# Patient Record
Sex: MALE | Race: BLACK OR AFRICAN AMERICAN | NOT HISPANIC OR LATINO | URBAN - METROPOLITAN AREA
[De-identification: names, ages, dates, MRNs, and addresses within clinical notes are randomized per-mention and may not be internally consistent; named-entity substitution may affect disease eponyms.]

---

## 2017-11-11 ENCOUNTER — OUTPATIENT (OUTPATIENT)
Dept: OUTPATIENT SERVICES | Facility: HOSPITAL | Age: 67
LOS: 1 days | End: 2017-11-11
Payer: MEDICARE

## 2017-11-11 ENCOUNTER — APPOINTMENT (OUTPATIENT)
Dept: MRI IMAGING | Facility: CLINIC | Age: 67
End: 2017-11-11
Payer: MEDICARE

## 2017-11-11 DIAGNOSIS — Z98.89 OTHER SPECIFIED POSTPROCEDURAL STATES: Chronic | ICD-10-CM

## 2017-11-11 DIAGNOSIS — M19.011 PRIMARY OSTEOARTHRITIS, RIGHT SHOULDER: ICD-10-CM

## 2017-11-11 PROCEDURE — 73221 MRI JOINT UPR EXTREM W/O DYE: CPT | Mod: 26,RT

## 2017-11-11 PROCEDURE — 73221 MRI JOINT UPR EXTREM W/O DYE: CPT

## 2018-01-20 ENCOUNTER — APPOINTMENT (OUTPATIENT)
Dept: MRI IMAGING | Facility: CLINIC | Age: 68
End: 2018-01-20
Payer: MEDICARE

## 2018-01-20 ENCOUNTER — OUTPATIENT (OUTPATIENT)
Dept: OUTPATIENT SERVICES | Facility: HOSPITAL | Age: 68
LOS: 1 days | End: 2018-01-20
Payer: MEDICARE

## 2018-01-20 DIAGNOSIS — Z98.89 OTHER SPECIFIED POSTPROCEDURAL STATES: Chronic | ICD-10-CM

## 2018-01-20 DIAGNOSIS — Z00.8 ENCOUNTER FOR OTHER GENERAL EXAMINATION: ICD-10-CM

## 2018-01-20 PROCEDURE — 73721 MRI JNT OF LWR EXTRE W/O DYE: CPT

## 2018-01-20 PROCEDURE — 73721 MRI JNT OF LWR EXTRE W/O DYE: CPT | Mod: 26,LT

## 2018-01-24 PROBLEM — M25.572 LEFT ANKLE PAIN, UNSPECIFIED CHRONICITY: Status: ACTIVE | Noted: 2018-01-24

## 2018-01-26 ENCOUNTER — APPOINTMENT (OUTPATIENT)
Dept: ORTHOPEDIC SURGERY | Facility: CLINIC | Age: 68
End: 2018-01-26
Payer: MEDICARE

## 2018-01-26 VITALS
DIASTOLIC BLOOD PRESSURE: 83 MMHG | HEART RATE: 89 BPM | BODY MASS INDEX: 30.51 KG/M2 | HEIGHT: 69 IN | SYSTOLIC BLOOD PRESSURE: 135 MMHG | WEIGHT: 206 LBS

## 2018-01-26 DIAGNOSIS — M25.572 PAIN IN LEFT ANKLE AND JOINTS OF LEFT FOOT: ICD-10-CM

## 2018-01-26 PROCEDURE — 73610 X-RAY EXAM OF ANKLE: CPT | Mod: LT

## 2018-01-26 PROCEDURE — 99203 OFFICE O/P NEW LOW 30 MIN: CPT

## 2018-01-30 ENCOUNTER — APPOINTMENT (OUTPATIENT)
Dept: ORTHOPEDIC SURGERY | Facility: CLINIC | Age: 68
End: 2018-01-30
Payer: MEDICARE

## 2018-01-30 VITALS — WEIGHT: 206 LBS | BODY MASS INDEX: 30.51 KG/M2 | HEIGHT: 69 IN

## 2018-01-30 DIAGNOSIS — Z78.9 OTHER SPECIFIED HEALTH STATUS: ICD-10-CM

## 2018-01-30 DIAGNOSIS — M19.011 PRIMARY OSTEOARTHRITIS, RIGHT SHOULDER: ICD-10-CM

## 2018-01-30 DIAGNOSIS — M75.21 BICIPITAL TENDINITIS, RIGHT SHOULDER: ICD-10-CM

## 2018-01-30 DIAGNOSIS — M75.111 INCOMPLETE ROTATOR CUFF TEAR OR RUPTURE OF RIGHT SHOULDER, NOT SPECIFIED AS TRAUMATIC: ICD-10-CM

## 2018-01-30 PROCEDURE — 99215 OFFICE O/P EST HI 40 MIN: CPT

## 2018-01-30 PROCEDURE — 73030 X-RAY EXAM OF SHOULDER: CPT | Mod: RT

## 2018-01-30 RX ORDER — TADALAFIL 10 MG/1
10 TABLET, FILM COATED ORAL
Qty: 6 | Refills: 0 | Status: ACTIVE | COMMUNITY
Start: 2017-09-20

## 2018-01-30 RX ORDER — SITAGLIPTIN 100 MG/1
100 TABLET, FILM COATED ORAL
Qty: 90 | Refills: 0 | Status: ACTIVE | COMMUNITY
Start: 2017-07-12

## 2018-01-30 RX ORDER — METFORMIN HYDROCHLORIDE 1000 MG/1
1000 TABLET, COATED ORAL
Qty: 180 | Refills: 0 | Status: ACTIVE | COMMUNITY
Start: 2017-07-12

## 2018-01-30 RX ORDER — ROSUVASTATIN CALCIUM 10 MG/1
10 TABLET, FILM COATED ORAL
Qty: 90 | Refills: 0 | Status: ACTIVE | COMMUNITY
Start: 2017-10-04

## 2018-01-30 RX ORDER — ROSUVASTATIN CALCIUM 5 MG/1
TABLET, FILM COATED ORAL
Refills: 0 | Status: ACTIVE | COMMUNITY

## 2018-01-30 RX ORDER — DICLOFENAC SODIUM 50 MG/1
50 TABLET, DELAYED RELEASE ORAL
Qty: 60 | Refills: 0 | Status: ACTIVE | COMMUNITY
Start: 2017-11-03

## 2018-01-30 RX ORDER — MONTELUKAST 10 MG/1
10 TABLET, FILM COATED ORAL
Qty: 90 | Refills: 0 | Status: ACTIVE | COMMUNITY
Start: 2017-04-07

## 2018-02-13 ENCOUNTER — OUTPATIENT (OUTPATIENT)
Dept: OUTPATIENT SERVICES | Facility: HOSPITAL | Age: 68
LOS: 1 days | End: 2018-02-13
Payer: MEDICARE

## 2018-02-13 VITALS
DIASTOLIC BLOOD PRESSURE: 74 MMHG | RESPIRATION RATE: 16 BRPM | WEIGHT: 209.88 LBS | OXYGEN SATURATION: 98 % | SYSTOLIC BLOOD PRESSURE: 119 MMHG | HEIGHT: 68 IN | HEART RATE: 74 BPM | TEMPERATURE: 98 F

## 2018-02-13 DIAGNOSIS — Z98.89 OTHER SPECIFIED POSTPROCEDURAL STATES: Chronic | ICD-10-CM

## 2018-02-13 DIAGNOSIS — S86.012S STRAIN OF LEFT ACHILLES TENDON, SEQUELA: ICD-10-CM

## 2018-02-13 DIAGNOSIS — Z01.818 ENCOUNTER FOR OTHER PREPROCEDURAL EXAMINATION: ICD-10-CM

## 2018-02-13 DIAGNOSIS — S86.012A STRAIN OF LEFT ACHILLES TENDON, INITIAL ENCOUNTER: ICD-10-CM

## 2018-02-13 DIAGNOSIS — Z90.89 ACQUIRED ABSENCE OF OTHER ORGANS: Chronic | ICD-10-CM

## 2018-02-13 DIAGNOSIS — Z98.890 OTHER SPECIFIED POSTPROCEDURAL STATES: Chronic | ICD-10-CM

## 2018-02-13 LAB
ANION GAP SERPL CALC-SCNC: 7 MMOL/L — SIGNIFICANT CHANGE UP (ref 5–17)
BUN SERPL-MCNC: 16 MG/DL — SIGNIFICANT CHANGE UP (ref 7–23)
CALCIUM SERPL-MCNC: 9.2 MG/DL — SIGNIFICANT CHANGE UP (ref 8.4–10.5)
CHLORIDE SERPL-SCNC: 106 MMOL/L — SIGNIFICANT CHANGE UP (ref 96–108)
CO2 SERPL-SCNC: 28 MMOL/L — SIGNIFICANT CHANGE UP (ref 22–31)
CREAT SERPL-MCNC: 1.17 MG/DL — SIGNIFICANT CHANGE UP (ref 0.5–1.3)
GLUCOSE SERPL-MCNC: 106 MG/DL — HIGH (ref 70–99)
HBA1C BLD-MCNC: 6 % — HIGH (ref 4–5.6)
HCT VFR BLD CALC: 47.6 % — SIGNIFICANT CHANGE UP (ref 39–50)
HGB BLD-MCNC: 15.2 G/DL — SIGNIFICANT CHANGE UP (ref 13–17)
MCHC RBC-ENTMCNC: 27.5 PG — SIGNIFICANT CHANGE UP (ref 27–34)
MCHC RBC-ENTMCNC: 32 GM/DL — SIGNIFICANT CHANGE UP (ref 32–36)
MCV RBC AUTO: 85.7 FL — SIGNIFICANT CHANGE UP (ref 80–100)
PLATELET # BLD AUTO: 202 K/UL — SIGNIFICANT CHANGE UP (ref 150–400)
POTASSIUM SERPL-MCNC: 4 MMOL/L — SIGNIFICANT CHANGE UP (ref 3.5–5.3)
POTASSIUM SERPL-SCNC: 4 MMOL/L — SIGNIFICANT CHANGE UP (ref 3.5–5.3)
RBC # BLD: 5.55 M/UL — SIGNIFICANT CHANGE UP (ref 4.2–5.8)
RBC # FLD: 14.9 % — HIGH (ref 10.3–14.5)
SODIUM SERPL-SCNC: 141 MMOL/L — SIGNIFICANT CHANGE UP (ref 135–145)
WBC # BLD: 6.9 K/UL — SIGNIFICANT CHANGE UP (ref 3.8–10.5)
WBC # FLD AUTO: 6.9 K/UL — SIGNIFICANT CHANGE UP (ref 3.8–10.5)

## 2018-02-13 PROCEDURE — 93005 ELECTROCARDIOGRAM TRACING: CPT

## 2018-02-13 PROCEDURE — 93010 ELECTROCARDIOGRAM REPORT: CPT | Mod: NC

## 2018-02-13 PROCEDURE — 85027 COMPLETE CBC AUTOMATED: CPT

## 2018-02-13 PROCEDURE — G0463: CPT

## 2018-02-13 PROCEDURE — 83036 HEMOGLOBIN GLYCOSYLATED A1C: CPT

## 2018-02-13 PROCEDURE — 80048 BASIC METABOLIC PNL TOTAL CA: CPT

## 2018-02-13 RX ORDER — DICLOFENAC SODIUM 75 MG/1
1 TABLET, DELAYED RELEASE ORAL
Qty: 0 | Refills: 0 | COMMUNITY

## 2018-02-13 NOTE — H&P PST ADULT - PROBLEM SELECTOR PLAN 1
Achilles Tendon Repair, V-Y Lengthening Tendon Transfer Graft Jacket Augmentation LEFT Foot on 3/1/18.

## 2018-02-13 NOTE — H&P PST ADULT - PSH
S/P colonoscopy  2013  S/P hernia repair  30 years ago  S/P laminectomy  and fusion - 2014 - L4-5  S/P tonsillectomy

## 2018-02-13 NOTE — H&P PST ADULT - HISTORY OF PRESENT ILLNESS
66yo male patient with 5 month history of difficulty standing, walking and pain in his left ankle. He states that in September he was playing basketball and fell. He had swelling in the ankle, and 5 days later he sought treatment. He was told x-rays are negative and he was treated with NSAIDs and ice. In November he returned w/ no improvement and and MRI was done. He was told that the MRI revealed and tendon rupture. He was referred to Dr. Jones in January and surgery was recommended. He was given a Cortisone injection at that time.  He has pain in his left ankle which he rates at 4-5/10. He is taking Diclofenac and occasionally Aleve with partial relief.  He is also wearing a support stocking on his left leg. He presents today for PSTs.

## 2018-02-13 NOTE — H&P PST ADULT - FAMILY HISTORY
Mother  Still living? No  Family history of stomach cancer, Age at diagnosis: Age Unknown     Sibling  Still living? Yes, Estimated age: 51-60  Family history of lung cancer, Age at diagnosis: Age Unknown  Family history of drug addiction, Age at diagnosis: Age Unknown  Family history of coronary artery disease in sister, Age at diagnosis: Age Unknown  Family history of hypertension in brother, Age at diagnosis: Age Unknown

## 2018-02-13 NOTE — H&P PST ADULT - PMH
Hyperlipemia    Obesity, Class I, BMI 30-34.9    Osteoarthritis    Rupture of left Achilles tendon, sequela    Seasonal allergies    Tear of right rotator cuff, unspecified tear extent    Type 2 diabetes mellitus    Vitamin D deficiency

## 2018-02-13 NOTE — H&P PST ADULT - MUSCULOSKELETAL
details… detailed exam no calf tenderness/no joint swelling/no joint erythema/no joint warmth/ROM intact/decreased ROM due to pain/diminished strength/left ankle

## 2018-02-13 NOTE — H&P PST ADULT - ASSESSMENT
66yo male patient scheduled for surgery on 3/1/18. He will obtain medical clearance from his PMD and he will obtain instructions re: management of his DM medications pre-op from him.  He will be NPO as per Anesthesia and will take Pepcid @ hs on 2/28 and on AM of surgery with a sip of water. He will hold Diclofenac and Aleve for one week pre-op. All other pre-op instructions reviewed with patient.

## 2018-02-26 RX ORDER — CHLORHEXIDINE GLUCONATE 213 G/1000ML
1 SOLUTION TOPICAL ONCE
Qty: 0 | Refills: 0 | Status: COMPLETED | OUTPATIENT
Start: 2018-03-01 | End: 2018-03-01

## 2018-02-26 NOTE — ASU DISCHARGE PLAN (ADULT/PEDIATRIC). - MEDICATION SUMMARY - MEDICATIONS TO TAKE
I will START or STAY ON the medications listed below when I get home from the hospital:    oxyCODONE 5 mg oral tablet  -- 1 tab(s) by mouth every 4 hours, As Needed for pain MDD:6  -- Caution federal law prohibits the transfer of this drug to any person other  than the person for whom it was prescribed.  It is very important that you take or use this exactly as directed.  Do not skip doses or discontinue unless directed by your doctor.  May cause drowsiness.  Alcohol may intensify this effect.  Use care when operating dangerous machinery.  This prescription cannot be refilled.  Using more of this medication than prescribed may cause serious breathing problems.    -- Indication: For Surgical pain    Tylenol 500 mg oral tablet  -- 2 tab(s) by mouth every 6 hours, As Needed for mild ankle pain  -- Indication: For Mild pain    Januvia 100 mg oral tablet  -- 1 tab(s) by mouth once a day  -- Indication: For Diabetes    metformin 500 mg oral tablet  -- 1 tab(s) by mouth 2 times a day  -- Indication: For Diabetes    Crestor 10 mg oral tablet  -- 1 tab(s) by mouth once a day (at bedtime)  -- Indication: For Cholesterol    Pepcid AC Maximum Strength 20 mg oral tablet  -- 1 tab(s) by mouth 2 times a day(x2 preoperatively )  -- Indication: For Stop    MiraLax oral powder for reconstitution  -- 1 packet(s) by mouth once a day (at bedtime), As Needed only if constipated  -- Indication: For Constipation    Colace 100 mg oral capsule  -- 1 cap(s) by mouth 2 times a day to prevent constipation. Stop for loose BMs  -- Indication: For Constipation    Singulair 10 mg oral tablet  -- 1 tab(s) by mouth once a day  -- Indication: For Pulmonary    Vitamin D3 2000 intl units oral tablet  -- 1 tab(s) by mouth once a day  -- Indication: For Vitamin

## 2018-02-26 NOTE — ASU DISCHARGE PLAN (ADULT/PEDIATRIC). - SPECIAL INSTRUCTIONS
apply ice to operative site 20 minutes on and 20 minutes off for next 48 hours  Pain meds as per MD  Take an over the counter stool softener like Colace to avoid constipation while taking a narcotic for pain May walk with crutches or walker and NO WEIGHT on [ Left ] foot as tolerated.   Elevate [ Left ] foot on blankets above heart level (toes above the nose) for 1 hour 3 times a day.  Apply ice anna to ankle for 30 minutes every 3 hours daily.  Keep Cast clean and dry daily.  Cover [ Left ] foot in shower daily with plastic bag & tape ( or cast bag ) to keep dry.  Take an over the counter stool softener like Colace to avoid constipation while taking a narcotic for pain  See the Surgeon in the office in 10 days.  Call the Surgeon for fever, fall or injury to operative leg, or severe ankle/calf pain. May walk with crutches or walker and NO WEIGHT on [ Left ] foot as tolerated.   Elevate [ Left ] foot on blankets above heart level (toes above the nose) for 1 hour 3 times a day.  Apply ice anna to ankle for 30 minutes every 3 hours daily.  Keep Cast clean and dry daily.  Cover [ Left ] foot in shower daily with plastic bag & tape ( or cast bag ) to keep dry.  Take an over the counter stool softener like Colace to avoid constipation while taking a narcotic for pain  Diabetes care & fingerstick glucose per routine. Call your Medical Doctor for glucoses < 100 or > 200  See the Surgeon in the office in 10 days.  Call the Surgeon for fever, fall or injury to operative leg, or severe ankle/calf pain.

## 2018-02-26 NOTE — ASU DISCHARGE PLAN (ADULT/PEDIATRIC). - INSTRUCTIONS
For Constipation :   • Increase your water intake. Drink at least 8 glasses of water daily.  • Try adding fiber to your diet by eating fruits, vegetables and foods that are rich in grains.  • If you do experience constipation, you may take an over-the-counter stool softener/laxative such as Lori Colace, [place] or  Milk of Magnesia.

## 2018-02-26 NOTE — ASU DISCHARGE PLAN (ADULT/PEDIATRIC). - FOLLOWUP APPOINTMENT CLINIC/PHYSICIAN
Call Dr. Jones's office [phone] to make an appointment to be seen within 7-10 days Call Dr. Jones's office [phone] to make an appointment to be seen within 7-10 days

## 2018-02-26 NOTE — ASU DISCHARGE PLAN (ADULT/PEDIATRIC). - MEDICATION SUMMARY - MEDICATIONS TO STOP TAKING
I will STOP taking the medications listed below when I get home from the hospital:    Aleve 220 mg oral tablet  -- 1 tab(s) by mouth once a day, As Needed    Pepcid AC Maximum Strength 20 mg oral tablet  -- 1 tab(s) by mouth 2 times a day(x2 preoperatively )

## 2018-02-28 RX ORDER — FAMOTIDINE 10 MG/ML
1 INJECTION INTRAVENOUS
Qty: 0 | Refills: 0 | COMMUNITY
Start: 2018-02-28 | End: 2018-03-01

## 2018-03-01 ENCOUNTER — INPATIENT (INPATIENT)
Facility: HOSPITAL | Age: 68
LOS: 0 days | Discharge: HOME | DRG: 502 | End: 2018-03-02
Attending: ORTHOPAEDIC SURGERY | Admitting: ORTHOPAEDIC SURGERY
Payer: COMMERCIAL

## 2018-03-01 ENCOUNTER — TRANSCRIPTION ENCOUNTER (OUTPATIENT)
Age: 68
End: 2018-03-01

## 2018-03-01 ENCOUNTER — APPOINTMENT (OUTPATIENT)
Dept: ORTHOPEDIC SURGERY | Facility: HOSPITAL | Age: 68
End: 2018-03-01

## 2018-03-01 VITALS
RESPIRATION RATE: 18 BRPM | OXYGEN SATURATION: 100 % | DIASTOLIC BLOOD PRESSURE: 72 MMHG | SYSTOLIC BLOOD PRESSURE: 101 MMHG | HEIGHT: 69 IN | HEART RATE: 67 BPM | TEMPERATURE: 98 F | WEIGHT: 197.31 LBS

## 2018-03-01 DIAGNOSIS — Z98.89 OTHER SPECIFIED POSTPROCEDURAL STATES: Chronic | ICD-10-CM

## 2018-03-01 DIAGNOSIS — Z98.890 OTHER SPECIFIED POSTPROCEDURAL STATES: Chronic | ICD-10-CM

## 2018-03-01 DIAGNOSIS — S86.012A STRAIN OF LEFT ACHILLES TENDON, INITIAL ENCOUNTER: ICD-10-CM

## 2018-03-01 DIAGNOSIS — Z01.818 ENCOUNTER FOR OTHER PREPROCEDURAL EXAMINATION: ICD-10-CM

## 2018-03-01 DIAGNOSIS — Z90.89 ACQUIRED ABSENCE OF OTHER ORGANS: Chronic | ICD-10-CM

## 2018-03-01 PROCEDURE — 99222 1ST HOSP IP/OBS MODERATE 55: CPT

## 2018-03-01 RX ORDER — DEXTROSE 50 % IN WATER 50 %
25 SYRINGE (ML) INTRAVENOUS ONCE
Qty: 0 | Refills: 0 | Status: DISCONTINUED | OUTPATIENT
Start: 2018-03-01 | End: 2018-03-02

## 2018-03-01 RX ORDER — OXYCODONE HYDROCHLORIDE 5 MG/1
5 TABLET ORAL
Qty: 0 | Refills: 0 | Status: DISCONTINUED | OUTPATIENT
Start: 2018-03-01 | End: 2018-03-02

## 2018-03-01 RX ORDER — SODIUM CHLORIDE 9 MG/ML
1000 INJECTION, SOLUTION INTRAVENOUS
Qty: 0 | Refills: 0 | Status: DISCONTINUED | OUTPATIENT
Start: 2018-03-01 | End: 2018-03-01

## 2018-03-01 RX ORDER — INSULIN LISPRO 100/ML
VIAL (ML) SUBCUTANEOUS
Qty: 0 | Refills: 0 | Status: DISCONTINUED | OUTPATIENT
Start: 2018-03-01 | End: 2018-03-02

## 2018-03-01 RX ORDER — HYDROMORPHONE HYDROCHLORIDE 2 MG/ML
0.5 INJECTION INTRAMUSCULAR; INTRAVENOUS; SUBCUTANEOUS
Qty: 0 | Refills: 0 | Status: DISCONTINUED | OUTPATIENT
Start: 2018-03-01 | End: 2018-03-01

## 2018-03-01 RX ORDER — CEFAZOLIN SODIUM 1 G
2000 VIAL (EA) INJECTION EVERY 8 HOURS
Qty: 0 | Refills: 0 | Status: COMPLETED | OUTPATIENT
Start: 2018-03-01 | End: 2018-03-02

## 2018-03-01 RX ORDER — GLUCAGON INJECTION, SOLUTION 0.5 MG/.1ML
1 INJECTION, SOLUTION SUBCUTANEOUS ONCE
Qty: 0 | Refills: 0 | Status: DISCONTINUED | OUTPATIENT
Start: 2018-03-01 | End: 2018-03-02

## 2018-03-01 RX ORDER — CEFAZOLIN SODIUM 1 G
2000 VIAL (EA) INJECTION ONCE
Qty: 0 | Refills: 0 | Status: COMPLETED | OUTPATIENT
Start: 2018-03-01 | End: 2018-03-01

## 2018-03-01 RX ORDER — SODIUM CHLORIDE 9 MG/ML
1000 INJECTION, SOLUTION INTRAVENOUS
Qty: 0 | Refills: 0 | Status: DISCONTINUED | OUTPATIENT
Start: 2018-03-01 | End: 2018-03-02

## 2018-03-01 RX ORDER — OXYCODONE HYDROCHLORIDE 5 MG/1
5 TABLET ORAL ONCE
Qty: 0 | Refills: 0 | Status: DISCONTINUED | OUTPATIENT
Start: 2018-03-01 | End: 2018-03-02

## 2018-03-01 RX ORDER — ATORVASTATIN CALCIUM 80 MG/1
40 TABLET, FILM COATED ORAL AT BEDTIME
Qty: 0 | Refills: 0 | Status: DISCONTINUED | OUTPATIENT
Start: 2018-03-01 | End: 2018-03-02

## 2018-03-01 RX ORDER — ONDANSETRON 8 MG/1
4 TABLET, FILM COATED ORAL ONCE
Qty: 0 | Refills: 0 | Status: DISCONTINUED | OUTPATIENT
Start: 2018-03-01 | End: 2018-03-02

## 2018-03-01 RX ORDER — OXYCODONE HYDROCHLORIDE 5 MG/1
10 TABLET ORAL
Qty: 0 | Refills: 0 | Status: DISCONTINUED | OUTPATIENT
Start: 2018-03-01 | End: 2018-03-02

## 2018-03-01 RX ORDER — HYDROMORPHONE HYDROCHLORIDE 2 MG/ML
0.5 INJECTION INTRAMUSCULAR; INTRAVENOUS; SUBCUTANEOUS
Qty: 0 | Refills: 0 | Status: DISCONTINUED | OUTPATIENT
Start: 2018-03-01 | End: 2018-03-02

## 2018-03-01 RX ORDER — DOCUSATE SODIUM 100 MG
100 CAPSULE ORAL THREE TIMES A DAY
Qty: 0 | Refills: 0 | Status: DISCONTINUED | OUTPATIENT
Start: 2018-03-01 | End: 2018-03-02

## 2018-03-01 RX ORDER — POLYETHYLENE GLYCOL 3350 17 G/17G
17 POWDER, FOR SOLUTION ORAL DAILY
Qty: 0 | Refills: 0 | Status: DISCONTINUED | OUTPATIENT
Start: 2018-03-01 | End: 2018-03-02

## 2018-03-01 RX ORDER — DICLOFENAC SODIUM 75 MG/1
1 TABLET, DELAYED RELEASE ORAL
Qty: 0 | Refills: 0 | COMMUNITY

## 2018-03-01 RX ORDER — METFORMIN HYDROCHLORIDE 850 MG/1
500 TABLET ORAL
Qty: 0 | Refills: 0 | Status: DISCONTINUED | OUTPATIENT
Start: 2018-03-01 | End: 2018-03-02

## 2018-03-01 RX ORDER — ONDANSETRON 8 MG/1
4 TABLET, FILM COATED ORAL EVERY 6 HOURS
Qty: 0 | Refills: 0 | Status: DISCONTINUED | OUTPATIENT
Start: 2018-03-01 | End: 2018-03-02

## 2018-03-01 RX ORDER — ONDANSETRON 8 MG/1
4 TABLET, FILM COATED ORAL ONCE
Qty: 0 | Refills: 0 | Status: DISCONTINUED | OUTPATIENT
Start: 2018-03-01 | End: 2018-03-01

## 2018-03-01 RX ORDER — ACETAMINOPHEN 500 MG
1000 TABLET ORAL ONCE
Qty: 0 | Refills: 0 | Status: COMPLETED | OUTPATIENT
Start: 2018-03-01 | End: 2018-03-01

## 2018-03-01 RX ORDER — OXYCODONE HYDROCHLORIDE 5 MG/1
1 TABLET ORAL
Qty: 30 | Refills: 0 | OUTPATIENT
Start: 2018-03-01

## 2018-03-01 RX ORDER — DEXTROSE 50 % IN WATER 50 %
12.5 SYRINGE (ML) INTRAVENOUS ONCE
Qty: 0 | Refills: 0 | Status: DISCONTINUED | OUTPATIENT
Start: 2018-03-01 | End: 2018-03-02

## 2018-03-01 RX ORDER — ACETAMINOPHEN 500 MG
1000 TABLET ORAL EVERY 6 HOURS
Qty: 0 | Refills: 0 | Status: COMPLETED | OUTPATIENT
Start: 2018-03-02 | End: 2018-03-02

## 2018-03-01 RX ORDER — ACETAMINOPHEN 500 MG
1000 TABLET ORAL EVERY 6 HOURS
Qty: 0 | Refills: 0 | Status: DISCONTINUED | OUTPATIENT
Start: 2018-03-01 | End: 2018-03-01

## 2018-03-01 RX ORDER — MONTELUKAST 4 MG/1
10 TABLET, CHEWABLE ORAL DAILY
Qty: 0 | Refills: 0 | Status: DISCONTINUED | OUTPATIENT
Start: 2018-03-01 | End: 2018-03-02

## 2018-03-01 RX ORDER — ENOXAPARIN SODIUM 100 MG/ML
40 INJECTION SUBCUTANEOUS EVERY 24 HOURS
Qty: 0 | Refills: 0 | Status: DISCONTINUED | OUTPATIENT
Start: 2018-03-02 | End: 2018-03-02

## 2018-03-01 RX ORDER — DEXTROSE 50 % IN WATER 50 %
1 SYRINGE (ML) INTRAVENOUS ONCE
Qty: 0 | Refills: 0 | Status: DISCONTINUED | OUTPATIENT
Start: 2018-03-01 | End: 2018-03-02

## 2018-03-01 RX ORDER — OXYCODONE AND ACETAMINOPHEN 5; 325 MG/1; MG/1
1 TABLET ORAL EVERY 4 HOURS
Qty: 0 | Refills: 0 | Status: DISCONTINUED | OUTPATIENT
Start: 2018-03-01 | End: 2018-03-01

## 2018-03-01 RX ORDER — ACETAMINOPHEN 500 MG
1000 TABLET ORAL EVERY 8 HOURS
Qty: 0 | Refills: 0 | Status: DISCONTINUED | OUTPATIENT
Start: 2018-03-02 | End: 2018-03-02

## 2018-03-01 RX ADMIN — Medication 1000 MILLIGRAM(S): at 19:15

## 2018-03-01 RX ADMIN — Medication 100 MILLIGRAM(S): at 22:46

## 2018-03-01 RX ADMIN — Medication 1000 MILLIGRAM(S): at 23:57

## 2018-03-01 RX ADMIN — Medication 400 MILLIGRAM(S): at 18:47

## 2018-03-01 RX ADMIN — HYDROMORPHONE HYDROCHLORIDE 0.5 MILLIGRAM(S): 2 INJECTION INTRAMUSCULAR; INTRAVENOUS; SUBCUTANEOUS at 19:15

## 2018-03-01 RX ADMIN — CHLORHEXIDINE GLUCONATE 1 APPLICATION(S): 213 SOLUTION TOPICAL at 11:22

## 2018-03-01 RX ADMIN — HYDROMORPHONE HYDROCHLORIDE 0.5 MILLIGRAM(S): 2 INJECTION INTRAMUSCULAR; INTRAVENOUS; SUBCUTANEOUS at 18:46

## 2018-03-01 RX ADMIN — Medication 400 MILLIGRAM(S): at 23:57

## 2018-03-01 RX ADMIN — SODIUM CHLORIDE 100 MILLILITER(S): 9 INJECTION, SOLUTION INTRAVENOUS at 18:38

## 2018-03-01 NOTE — CONSULT NOTE ADULT - PROBLEM SELECTOR RECOMMENDATION 9
S/p repair, asymptomatic post-op, pain control, IVF hydration, I & O monitoring, incentive spirometry. PT & OT consults, mobilization & weight bearing as per orthopedic surgery team.

## 2018-03-01 NOTE — CONSULT NOTE ADULT - PROBLEM SELECTOR RECOMMENDATION 2
controlled, Glycohemoglobin done on February 13th, 2018 was 6.0, FS pre-op was 96 mg/dl, will hold Januvia, continue only on Metformin 500 mg PO BID in addition to insulin Lispro on a sliding scale before meals.

## 2018-03-01 NOTE — CONSULT NOTE ADULT - SUBJECTIVE AND OBJECTIVE BOX
This is a 66 y/o M with PMH of Type II DM, Dyslipidemia, This is a 66 y/o M with PMH of Type II DM, Dyslipidemia, OA, Seasonal Allergies, and Obesity presented for left achilles tendon repair. Patient had a fall on September while playing basketball, had moderate pain associated with swelling, that was worsening with weight baring, and improve with Diclofenac, had a x ray then that was negative for fracture, was treated with NSAIDs, with little improvement, had MRI that revealed achilles tendon rupture so was scheduled for repair surgery. Routine post-op medical evaluation was requested. Patient denies any chest pain, SOB, palpitations, dizziness, headache, paraesthesias, or focal muscle weakness.              ALLERGIES :  NKDA.              PAST MEDICAL & SURGICAL HISTORY:    Vitamin D deficiency  Tear of right rotator cuff, unspecified tear extent  Osteoarthritis  Seasonal allergies  Type 2 diabetes mellitus  Obesity, Class I, BMI 30-34.9  Rupture of left Achilles tendon, sequela  Hyperlipemia  S/P tonsillectomy  S/P laminectomy: and fusion - 2014 - L4-5  S/P colonoscopy: 2013  S/P hernia repair: 30 years ago                SOCIAL HISTORY :    , lives with family, non smoker, no ETOH or drug abuse.                FAMILY HISTORY :     Family history of hypertension in brother (Sibling)  Family history of coronary artery disease in sister (Sibling)  Family history of drug addiction (Sibling)  Family history of lung cancer (Sibling)  Family history of stomach cancer              MEDICATIONS  (STANDING):    acetaminophen   Tablet 1000 milliGRAM(s) Oral every 8 hours  acetaminophen  IVPB. 1000 milliGRAM(s) IV Intermittent every 6 hours  atorvastatin 40 milliGRAM(s) Oral at bedtime  ceFAZolin   IVPB 2000 milliGRAM(s) IV Intermittent every 8 hours  dextrose 5%. 1000 milliLiter(s) (50 mL/Hr) IV Continuous <Continuous>  dextrose 50% Injectable 12.5 Gram(s) IV Push once  dextrose 50% Injectable 25 Gram(s) IV Push once  dextrose 50% Injectable 25 Gram(s) IV Push once  docusate sodium 100 milliGRAM(s) Oral three times a day  enoxaparin Injectable 40 milliGRAM(s) SubCutaneous every 24 hours  insulin lispro (HumaLOG) corrective regimen sliding scale   SubCutaneous three times a day before meals  lactated ringers. 1000 milliLiter(s) (80 mL/Hr) IV Continuous <Continuous>  lactated ringers. 1000 milliLiter(s) (100 mL/Hr) IV Continuous <Continuous>  metFORMIN 500 milliGRAM(s) Oral two times a day with meals  montelukast 10 milliGRAM(s) Oral daily              MEDICATIONS  (PRN):    dextrose Gel 1 Dose(s) Oral once PRN Blood Glucose LESS THAN 70 milliGRAM(s)/deciliter  glucagon  Injectable 1 milliGRAM(s) IntraMuscular once PRN Glucose LESS THAN 70 milligrams/deciliter  HYDROmorphone  Injectable 0.5 milliGRAM(s) IV Push every 3 hours PRN Severe Pain (7 - 10)  HYDROmorphone  Injectable 0.5 milliGRAM(s) IV Push every 10 minutes PRN Moderate Pain  ondansetron Injectable 4 milliGRAM(s) IV Push once PRN Nausea and/or Vomiting  ondansetron Injectable 4 milliGRAM(s) IV Push every 6 hours PRN Nausea and/or Vomiting  oxyCODONE    IR 5 milliGRAM(s) Oral once PRN Moderate Pain (4 - 6)  oxyCODONE    IR 10 milliGRAM(s) Oral every 3 hours PRN Moderate Pain  oxyCODONE    IR 5 milliGRAM(s) Oral every 3 hours PRN Mild Pain  polyethylene glycol 3350 17 Gram(s) Oral daily PRN Constipation              Home Medications:    Colace 100 mg oral capsule: 1 cap(s) orally 2 times a day to prevent constipation. Stop for loose BMs (01 Mar 2018 18:48)  Crestor 10 mg oral tablet: 1 tab(s) orally once a day (at bedtime) (01 Mar 2018 11:42)  Januvia 100 mg oral tablet: 1 tab(s) orally once a day (01 Mar 2018 11:42)  metformin 500 mg oral tablet: 1 tab(s) orally 2 times a day (01 Mar 2018 11:42)  MiraLax oral powder for reconstitution: 1 packet(s) orally once a day (at bedtime), As Needed only if constipated (01 Mar 2018 18:48)  Singulair 10 mg oral tablet: 1 tab(s) orally once a day (01 Mar 2018 11:42)  Tylenol 500 mg oral tablet: 2 tab(s) orally every 6 hours, As Needed for mild ankle pain (01 Mar 2018 18:48)  Vitamin D3 2000 intl units oral tablet: 1 tab(s) orally once a day (01 Mar 2018 11:42)        REVIEW OF SYSTEMS:    CONSTITUTIONAL: No fever, weight loss, or fatigue.  EYES: No eye pain, visual disturbances, or discharge.  ENMT:  No difficulty hearing, tinnitus, vertigo; No sinus or throat pain.  NECK: No pain or stiffness.  RESPIRATORY: No cough, wheezing, or hemoptysis; No shortness of breath.  CARDIOVASCULAR: No chest pain, palpitations, dizziness, or leg swelling.  GASTROINTESTINAL: No abdominal pain, no nausea, vomiting, or hematemesis; No diarrhea or Change in bowel habits. No melena or hematochezia.  GENITOURINARY: No dysuria, frequency, hematuria, or incontinence.  NEUROLOGICAL: No headaches, focal muscle weakness, numbness, or tremors.  SKIN: No itching, burning or rashes.  MUSCULOSKELETAL: No joint swelling or pain other than surgical site, well controlled.  PSYCHIATRIC: No depression, anxiety, or agitation.  HEME/LYMPH: No easy bruising, bleeding gums, or nose bleed.  ALLERGY AND IMMUNOLOGIC: No hives or eczema.                  Vital signs:  Vital Signs Last 24 Hrs  T(C): 36.4 (01 Mar 2018 23:13), Max: 36.7 (01 Mar 2018 11:23)  T(F): 97.5 (01 Mar 2018 23:13), Max: 98.1 (01 Mar 2018 11:23)  HR: 74 (01 Mar 2018 23:13) (67 - 92)  BP: 104/65 (01 Mar 2018 23:13) (101/72 - 126/82)  BP(mean): --  RR: 14 (01 Mar 2018 23:13) (9 - 19)  SpO2: 98% (01 Mar 2018 23:13) (98% - 100%)        PHYSICAL EXAM:    GENERAL: NAD, well-groomed, well-developed.  HEAD:  Atraumatic, Normocephalic.  EYES: PERRLA, conjunctiva clear.  ENMT: no nasal discharge, no tye-pharyngeal erythema or exudates, MMM.   NECK: Supple, No JVD.  NERVOUS SYSTEM:  Alert & oriented X3, neurologically intact grossly.  CHEST/LUNG: Good air entry B/L, no rales, rhonchi, or wheezing.  HEART: Normal S1 & S2, no murmurs, or extra sounds.  ABDOMEN: Soft, non tender, non distended; bowel sounds present, no palpable masses or organomegaly.  EXTREMITIES:  No clubbing, cyanosis, or edema.  VASCULAR: 2+ radial, DPA / PTA pulses B/L.  SKIN: No rashes or lesions.  PSYCH: normal affect & behavior.              LABs:    Basic Metabolic Panel (02.13.18 @ 09:28)    Sodium, Serum: 141 mmol/L    Potassium, Serum: 4.0 mmol/L    Chloride, Serum: 106 mmol/L    Carbon Dioxide, Serum: 28 mmol/L    Anion Gap, Serum: 7 mmol/L    Blood Urea Nitrogen, Serum: 16 mg/dL    Creatinine, Serum: 1.17 mg/dL    Glucose, Serum: 106 mg/dL    Calcium, Total Serum: 9.2 mg/dL    eGFR if Non : 65: Interpretative comment    eGFR if : 75 mL/min/1.73M2    Hemoglobin A1C, Whole Blood (02.13.18 @ 14:50)    Hemoglobin A1C, Whole Blood: 6.0: Method: Immunoassay       Reference Range                4.0-5.6%       High risk (prediabetic)        5.7-6.4%       Diabetic, diagnostic             >=6.5%       ADA diabetic treatment goal       <7.0%  The Hemoglobin A1c testing is NGSP-certified.Reference ranges are based  upon the 2010 recommendations of  the American Diabetes Association.  Interpretation may vary for children  and adolescents. %    Complete Blood Count (02.13.18 @ 09:28)    WBC Count: 6.9 K/uL    RBC Count: 5.55 M/uL    Hemoglobin: 15.2 g/dL    Hematocrit: 47.6 %    Mean Cell Volume: 85.7 fl    Mean Cell Hemoglobin: 27.5 pg    Mean Cell Hemoglobin Conc: 32.0 gm/dL    Red Cell Distrib Width: 14.9 %    Platelet Count - Automated: 202 K/uL              EKG     As per my review shows SR at 70/min, normal UT & QTc intervals, normal QRS voltage, duration, and axis (+60), with normal transition, no ST-T abnormality.    - This is a 68 y/o M with PMH of Type II DM, Dyslipidemia, OA, Seasonal Allergies, and Obesity presented for left achilles tendon repair. Patient had a fall on September while playing basketball, had moderate pain associated with swelling, that was worsening with weight baring, and improve with Diclofenac, had a x ray then that was negative for fracture, was treated with NSAIDs, with little improvement, had MRI that revealed achilles tendon rupture so was scheduled for repair surgery. Routine post-op medical evaluation was requested. Patient denies any chest pain, SOB, palpitations, dizziness, headache, paraesthesias, or focal muscle weakness.              ALLERGIES :  NKDA.              PAST MEDICAL & SURGICAL HISTORY:    Vitamin D deficiency  Tear of right rotator cuff, unspecified tear extent  Osteoarthritis  Seasonal allergies  Type 2 diabetes mellitus  Obesity, Class I, BMI 30-34.9  Rupture of left Achilles tendon, sequela  Hyperlipemia  S/P tonsillectomy  S/P laminectomy: and fusion - 2014 - L4-5  S/P colonoscopy: 2013  S/P hernia repair: 30 years ago                SOCIAL HISTORY :    , lives with family, non smoker, no ETOH or drug abuse.                FAMILY HISTORY :     Family history of hypertension in brother (Sibling)  Family history of coronary artery disease in sister (Sibling)  Family history of drug addiction (Sibling)  Family history of lung cancer (Sibling)  Family history of stomach cancer              MEDICATIONS  (STANDING):    acetaminophen   Tablet 1000 milliGRAM(s) Oral every 8 hours  acetaminophen  IVPB. 1000 milliGRAM(s) IV Intermittent every 6 hours  atorvastatin 40 milliGRAM(s) Oral at bedtime  ceFAZolin   IVPB 2000 milliGRAM(s) IV Intermittent every 8 hours  dextrose 5%. 1000 milliLiter(s) (50 mL/Hr) IV Continuous <Continuous>  dextrose 50% Injectable 12.5 Gram(s) IV Push once  dextrose 50% Injectable 25 Gram(s) IV Push once  dextrose 50% Injectable 25 Gram(s) IV Push once  docusate sodium 100 milliGRAM(s) Oral three times a day  enoxaparin Injectable 40 milliGRAM(s) SubCutaneous every 24 hours  insulin lispro (HumaLOG) corrective regimen sliding scale   SubCutaneous three times a day before meals  lactated ringers. 1000 milliLiter(s) (80 mL/Hr) IV Continuous <Continuous>  lactated ringers. 1000 milliLiter(s) (100 mL/Hr) IV Continuous <Continuous>  metFORMIN 500 milliGRAM(s) Oral two times a day with meals  montelukast 10 milliGRAM(s) Oral daily              MEDICATIONS  (PRN):    dextrose Gel 1 Dose(s) Oral once PRN Blood Glucose LESS THAN 70 milliGRAM(s)/deciliter  glucagon  Injectable 1 milliGRAM(s) IntraMuscular once PRN Glucose LESS THAN 70 milligrams/deciliter  HYDROmorphone  Injectable 0.5 milliGRAM(s) IV Push every 3 hours PRN Severe Pain (7 - 10)  HYDROmorphone  Injectable 0.5 milliGRAM(s) IV Push every 10 minutes PRN Moderate Pain  ondansetron Injectable 4 milliGRAM(s) IV Push once PRN Nausea and/or Vomiting  ondansetron Injectable 4 milliGRAM(s) IV Push every 6 hours PRN Nausea and/or Vomiting  oxyCODONE    IR 5 milliGRAM(s) Oral once PRN Moderate Pain (4 - 6)  oxyCODONE    IR 10 milliGRAM(s) Oral every 3 hours PRN Moderate Pain  oxyCODONE    IR 5 milliGRAM(s) Oral every 3 hours PRN Mild Pain  polyethylene glycol 3350 17 Gram(s) Oral daily PRN Constipation              Home Medications:    Colace 100 mg oral capsule: 1 cap(s) orally 2 times a day to prevent constipation. Stop for loose BMs (01 Mar 2018 18:48)  Crestor 10 mg oral tablet: 1 tab(s) orally once a day (at bedtime) (01 Mar 2018 11:42)  Januvia 100 mg oral tablet: 1 tab(s) orally once a day (01 Mar 2018 11:42)  metformin 500 mg oral tablet: 1 tab(s) orally 2 times a day (01 Mar 2018 11:42)  MiraLax oral powder for reconstitution: 1 packet(s) orally once a day (at bedtime), As Needed only if constipated (01 Mar 2018 18:48)  Singulair 10 mg oral tablet: 1 tab(s) orally once a day (01 Mar 2018 11:42)  Tylenol 500 mg oral tablet: 2 tab(s) orally every 6 hours, As Needed for mild ankle pain (01 Mar 2018 18:48)  Vitamin D3 2000 intl units oral tablet: 1 tab(s) orally once a day (01 Mar 2018 11:42)        REVIEW OF SYSTEMS:    CONSTITUTIONAL: No fever, weight loss, or fatigue.  EYES: No eye pain, visual disturbances, or discharge.  ENMT:  No difficulty hearing, tinnitus, vertigo; No sinus or throat pain.  NECK: No pain or stiffness.  RESPIRATORY: No cough, wheezing, or hemoptysis; No shortness of breath.  CARDIOVASCULAR: No chest pain, palpitations, dizziness, or leg swelling.  GASTROINTESTINAL: No abdominal pain, no nausea, vomiting, or hematemesis; No diarrhea or Change in bowel habits. No melena or hematochezia.  GENITOURINARY: No dysuria, frequency, hematuria, or incontinence.  NEUROLOGICAL: No headaches, focal muscle weakness, numbness, or tremors.  SKIN: No itching, burning or rashes.  MUSCULOSKELETAL: No joint swelling or pain other than surgical site, well controlled.  PSYCHIATRIC: No depression, anxiety, or agitation.  HEME/LYMPH: No easy bruising, bleeding gums, or nose bleed.  ALLERGY AND IMMUNOLOGIC: No hives or eczema.                  Vital signs:  Vital Signs Last 24 Hrs  T(C): 36.4 (01 Mar 2018 23:13), Max: 36.7 (01 Mar 2018 11:23)  T(F): 97.5 (01 Mar 2018 23:13), Max: 98.1 (01 Mar 2018 11:23)  HR: 74 (01 Mar 2018 23:13) (67 - 92)  BP: 104/65 (01 Mar 2018 23:13) (101/72 - 126/82)  BP(mean): --  RR: 14 (01 Mar 2018 23:13) (9 - 19)  SpO2: 98% (01 Mar 2018 23:13) (98% - 100%)        PHYSICAL EXAM:    GENERAL: NAD, well-groomed, well-developed, obese.  HEAD:  Atraumatic, Normocephalic.  EYES: PERRLA, conjunctiva clear.  ENMT: no nasal discharge, no tye-pharyngeal erythema or exudates, MMM.   NECK: Supple, No JVD.  NERVOUS SYSTEM:  Alert & oriented X3, neurologically intact grossly.  CHEST/LUNG: Good air entry B/L, no rales, rhonchi, or wheezing.  HEART: Normal S1 & S2, no murmurs, or extra sounds.  ABDOMEN: Soft, non tender, non distended; bowel sounds present, no palpable masses or organomegaly.  EXTREMITIES:  No clubbing, cyanosis, or edema.  VASCULAR: 2+ radial pulses, 2+ DPA / PTA pulses on the right, good capillary refill on left toe nails (left side post-op cast).  SKIN: No rashes or lesions.  PSYCH: normal affect & behavior.              LABs:    Basic Metabolic Panel (02.13.18 @ 09:28)    Sodium, Serum: 141 mmol/L    Potassium, Serum: 4.0 mmol/L    Chloride, Serum: 106 mmol/L    Carbon Dioxide, Serum: 28 mmol/L    Anion Gap, Serum: 7 mmol/L    Blood Urea Nitrogen, Serum: 16 mg/dL    Creatinine, Serum: 1.17 mg/dL    Glucose, Serum: 106 mg/dL    Calcium, Total Serum: 9.2 mg/dL    eGFR if Non : 65: Interpretative comment    eGFR if : 75 mL/min/1.73M2    Hemoglobin A1C, Whole Blood (02.13.18 @ 14:50)    Hemoglobin A1C, Whole Blood: 6.0: Method: Immunoassay       Reference Range                4.0-5.6%       High risk (prediabetic)        5.7-6.4%       Diabetic, diagnostic             >=6.5%       ADA diabetic treatment goal       <7.0%  The Hemoglobin A1c testing is NGSP-certified.Reference ranges are based  upon the 2010 recommendations of  the American Diabetes Association.  Interpretation may vary for children  and adolescents. %    Complete Blood Count (02.13.18 @ 09:28)    WBC Count: 6.9 K/uL    RBC Count: 5.55 M/uL    Hemoglobin: 15.2 g/dL    Hematocrit: 47.6 %    Mean Cell Volume: 85.7 fl    Mean Cell Hemoglobin: 27.5 pg    Mean Cell Hemoglobin Conc: 32.0 gm/dL    Red Cell Distrib Width: 14.9 %    Platelet Count - Automated: 202 K/uL              EKG     As per my review shows SR at 70/min, normal MO & QTc intervals, normal QRS voltage, duration, and axis (+60), with normal transition, no ST-T abnormality.      -

## 2018-03-01 NOTE — CONSULT NOTE ADULT - ASSESSMENT
Asymptomatic post-op 66 y/o M with PMH of Type II DM, Dyslipidemia, OA, Seasonal Allergies, and Obesity.

## 2018-03-01 NOTE — CONSULT NOTE ADULT - PROBLEM SELECTOR RECOMMENDATION 6
Patient is at high risk for VTE, was started on right sided sequential compression device for DVT prophylaxis in addition to LMWH 40 mg subcutaneous daily.

## 2018-03-01 NOTE — BRIEF OPERATIVE NOTE - PROCEDURE
<<-----Click on this checkbox to enter Procedure Achilles tendon repair  03/01/2018    Active  [email or web address]

## 2018-03-02 ENCOUNTER — TRANSCRIPTION ENCOUNTER (OUTPATIENT)
Age: 68
End: 2018-03-02

## 2018-03-02 VITALS
HEART RATE: 70 BPM | SYSTOLIC BLOOD PRESSURE: 112 MMHG | OXYGEN SATURATION: 97 % | DIASTOLIC BLOOD PRESSURE: 72 MMHG | RESPIRATION RATE: 18 BRPM | TEMPERATURE: 98 F

## 2018-03-02 DIAGNOSIS — E78.5 HYPERLIPIDEMIA, UNSPECIFIED: ICD-10-CM

## 2018-03-02 DIAGNOSIS — E66.9 OBESITY, UNSPECIFIED: ICD-10-CM

## 2018-03-02 DIAGNOSIS — J30.2 OTHER SEASONAL ALLERGIC RHINITIS: ICD-10-CM

## 2018-03-02 DIAGNOSIS — Z29.9 ENCOUNTER FOR PROPHYLACTIC MEASURES, UNSPECIFIED: ICD-10-CM

## 2018-03-02 DIAGNOSIS — S86.012S STRAIN OF LEFT ACHILLES TENDON, SEQUELA: ICD-10-CM

## 2018-03-02 DIAGNOSIS — E11.9 TYPE 2 DIABETES MELLITUS WITHOUT COMPLICATIONS: ICD-10-CM

## 2018-03-02 LAB
ANION GAP SERPL CALC-SCNC: 9 MMOL/L — SIGNIFICANT CHANGE UP (ref 5–17)
BASOPHILS # BLD AUTO: 0 K/UL — SIGNIFICANT CHANGE UP (ref 0–0.2)
BASOPHILS NFR BLD AUTO: 0.4 % — SIGNIFICANT CHANGE UP (ref 0–2)
BUN SERPL-MCNC: 12 MG/DL — SIGNIFICANT CHANGE UP (ref 7–23)
CALCIUM SERPL-MCNC: 9.4 MG/DL — SIGNIFICANT CHANGE UP (ref 8.4–10.5)
CHLORIDE SERPL-SCNC: 106 MMOL/L — SIGNIFICANT CHANGE UP (ref 96–108)
CO2 SERPL-SCNC: 27 MMOL/L — SIGNIFICANT CHANGE UP (ref 22–31)
CREAT SERPL-MCNC: 1.03 MG/DL — SIGNIFICANT CHANGE UP (ref 0.5–1.3)
EOSINOPHIL # BLD AUTO: 0 K/UL — SIGNIFICANT CHANGE UP (ref 0–0.5)
EOSINOPHIL NFR BLD AUTO: 0 % — SIGNIFICANT CHANGE UP (ref 0–6)
GLUCOSE SERPL-MCNC: 120 MG/DL — HIGH (ref 70–99)
HCT VFR BLD CALC: 43.5 % — SIGNIFICANT CHANGE UP (ref 39–50)
HGB BLD-MCNC: 13.8 G/DL — SIGNIFICANT CHANGE UP (ref 13–17)
LYMPHOCYTES # BLD AUTO: 1.4 K/UL — SIGNIFICANT CHANGE UP (ref 1–3.3)
LYMPHOCYTES # BLD AUTO: 13.7 % — SIGNIFICANT CHANGE UP (ref 13–44)
MCHC RBC-ENTMCNC: 27.8 PG — SIGNIFICANT CHANGE UP (ref 27–34)
MCHC RBC-ENTMCNC: 31.8 GM/DL — LOW (ref 32–36)
MCV RBC AUTO: 87.4 FL — SIGNIFICANT CHANGE UP (ref 80–100)
MONOCYTES # BLD AUTO: 0.7 K/UL — SIGNIFICANT CHANGE UP (ref 0–0.9)
MONOCYTES NFR BLD AUTO: 7 % — SIGNIFICANT CHANGE UP (ref 2–14)
NEUTROPHILS # BLD AUTO: 7.9 K/UL — HIGH (ref 1.8–7.4)
NEUTROPHILS NFR BLD AUTO: 78.9 % — HIGH (ref 43–77)
PLATELET # BLD AUTO: 238 K/UL — SIGNIFICANT CHANGE UP (ref 150–400)
POTASSIUM SERPL-MCNC: 4.7 MMOL/L — SIGNIFICANT CHANGE UP (ref 3.5–5.3)
POTASSIUM SERPL-SCNC: 4.7 MMOL/L — SIGNIFICANT CHANGE UP (ref 3.5–5.3)
RBC # BLD: 4.97 M/UL — SIGNIFICANT CHANGE UP (ref 4.2–5.8)
RBC # FLD: 13.9 % — SIGNIFICANT CHANGE UP (ref 10.3–14.5)
SODIUM SERPL-SCNC: 142 MMOL/L — SIGNIFICANT CHANGE UP (ref 135–145)
WBC # BLD: 10 K/UL — SIGNIFICANT CHANGE UP (ref 3.8–10.5)
WBC # FLD AUTO: 10 K/UL — SIGNIFICANT CHANGE UP (ref 3.8–10.5)

## 2018-03-02 PROCEDURE — G8979: CPT

## 2018-03-02 PROCEDURE — 97165 OT EVAL LOW COMPLEX 30 MIN: CPT

## 2018-03-02 PROCEDURE — 99239 HOSP IP/OBS DSCHRG MGMT >30: CPT

## 2018-03-02 PROCEDURE — 97116 GAIT TRAINING THERAPY: CPT

## 2018-03-02 PROCEDURE — G8988: CPT

## 2018-03-02 PROCEDURE — 97530 THERAPEUTIC ACTIVITIES: CPT

## 2018-03-02 PROCEDURE — 85027 COMPLETE CBC AUTOMATED: CPT

## 2018-03-02 PROCEDURE — 97110 THERAPEUTIC EXERCISES: CPT

## 2018-03-02 PROCEDURE — C1713: CPT

## 2018-03-02 PROCEDURE — 80048 BASIC METABOLIC PNL TOTAL CA: CPT

## 2018-03-02 PROCEDURE — 97161 PT EVAL LOW COMPLEX 20 MIN: CPT

## 2018-03-02 PROCEDURE — 97535 SELF CARE MNGMENT TRAINING: CPT

## 2018-03-02 PROCEDURE — 94664 DEMO&/EVAL PT USE INHALER: CPT

## 2018-03-02 PROCEDURE — 82962 GLUCOSE BLOOD TEST: CPT

## 2018-03-02 PROCEDURE — 99261: CPT

## 2018-03-02 PROCEDURE — G8980: CPT

## 2018-03-02 PROCEDURE — G8987: CPT

## 2018-03-02 PROCEDURE — 97164 PT RE-EVAL EST PLAN CARE: CPT

## 2018-03-02 PROCEDURE — 27652 REPAIR/GRAFT ACHILLES TENDON: CPT

## 2018-03-02 PROCEDURE — G8989: CPT

## 2018-03-02 PROCEDURE — G8978: CPT

## 2018-03-02 RX ADMIN — OXYCODONE HYDROCHLORIDE 10 MILLIGRAM(S): 5 TABLET ORAL at 06:00

## 2018-03-02 RX ADMIN — METFORMIN HYDROCHLORIDE 500 MILLIGRAM(S): 850 TABLET ORAL at 08:49

## 2018-03-02 RX ADMIN — Medication 400 MILLIGRAM(S): at 05:30

## 2018-03-02 RX ADMIN — Medication 100 MILLIGRAM(S): at 06:15

## 2018-03-02 RX ADMIN — MONTELUKAST 10 MILLIGRAM(S): 4 TABLET, CHEWABLE ORAL at 11:40

## 2018-03-02 RX ADMIN — OXYCODONE HYDROCHLORIDE 5 MILLIGRAM(S): 5 TABLET ORAL at 13:18

## 2018-03-02 RX ADMIN — OXYCODONE HYDROCHLORIDE 10 MILLIGRAM(S): 5 TABLET ORAL at 05:33

## 2018-03-02 RX ADMIN — Medication 100 MILLIGRAM(S): at 05:31

## 2018-03-02 RX ADMIN — Medication 1000 MILLIGRAM(S): at 12:20

## 2018-03-02 RX ADMIN — OXYCODONE HYDROCHLORIDE 5 MILLIGRAM(S): 5 TABLET ORAL at 14:15

## 2018-03-02 RX ADMIN — Medication 100 MILLIGRAM(S): at 13:18

## 2018-03-02 RX ADMIN — Medication 1000 MILLIGRAM(S): at 05:37

## 2018-03-02 RX ADMIN — ENOXAPARIN SODIUM 40 MILLIGRAM(S): 100 INJECTION SUBCUTANEOUS at 08:48

## 2018-03-02 RX ADMIN — Medication 400 MILLIGRAM(S): at 11:40

## 2018-03-02 RX ADMIN — OXYCODONE HYDROCHLORIDE 10 MILLIGRAM(S): 5 TABLET ORAL at 08:50

## 2018-03-02 RX ADMIN — OXYCODONE HYDROCHLORIDE 10 MILLIGRAM(S): 5 TABLET ORAL at 09:45

## 2018-03-02 NOTE — PHYSICAL THERAPY INITIAL EVALUATION ADULT - RANGE OF MOTION EXAMINATION, REHAB EVAL
bilateral upper extremity ROM was WFL (within functional limits)/bilateral lower extremity ROM was WFL (within functional limits)/left ankle not tested due to surg and cast

## 2018-03-02 NOTE — PROGRESS NOTE ADULT - PROBLEM SELECTOR PLAN 4
- encouraged weight loss plan once fully rehabilitated from surgery, should discuss with his PCP and Orthopedist first prior to starting

## 2018-03-02 NOTE — DISCHARGE NOTE ADULT - MEDICATION SUMMARY - MEDICATIONS TO STOP TAKING
I will STOP taking the medications listed below when I get home from the hospital:    diclofenac potassium 50 mg oral tablet  -- 1 tab(s) by mouth 2 times a day    Aleve 220 mg oral tablet  -- 1 tab(s) by mouth once a day, As Needed    Pepcid AC Maximum Strength 20 mg oral tablet  -- 1 tab(s) by mouth 2 times a day(x2 preoperatively )

## 2018-03-02 NOTE — PHYSICAL THERAPY INITIAL EVALUATION ADULT - GAIT TRAINING, PT EVAL
Ambulate 50 feet with RW NWB LE independently in 2-3 days . Negotiate 15 steps with 1 handrail and crutch independently NWB  LE in 2-3 days.
1-3 days: pt amb 50 feet with RW independently

## 2018-03-02 NOTE — DISCHARGE NOTE ADULT - PATIENT PORTAL LINK FT
You can access the [facility] Portal, offered by [facility], by registering with the following website: [email or web address]

## 2018-03-02 NOTE — DISCHARGE NOTE ADULT - NSFTFSERV1RD_GEN_ALL_CORE
wound care and assessment/observation and assessment/teaching and training/medication teaching and assessment/rehabilitation services

## 2018-03-02 NOTE — PROGRESS NOTE ADULT - ASSESSMENT
Asymptomatic post-op 68 y/o M with PMH of Type II DM, Dyslipidemia, OA, Seasonal Allergies, and Obesity.

## 2018-03-02 NOTE — DISCHARGE NOTE ADULT - INSTRUCTIONS
consistent carbohydrate  For Constipation :   • Increase your water intake. Drink at least 8 glasses of water daily.  • Try adding fiber to your diet by eating fruits, vegetables and foods that are rich in grains.  • If you do experience constipation, you may take an over-the-counter stool softener/laxative such as Lori Colace, [place] or  Milk of Magnesia.

## 2018-03-02 NOTE — PHYSICAL THERAPY INITIAL EVALUATION ADULT - IMPAIRMENTS FOUND, PT EVAL
gait, locomotion, and balance
muscle strength/gait, locomotion, and balance/ROM/aerobic capacity/endurance

## 2018-03-02 NOTE — DISCHARGE NOTE ADULT - CARE PLAN
Principal Discharge DX:	Rupture of left Achilles tendon, sequela  Goal:	Improve ambulation, ADLs and quality of life  Assessment and plan of treatment:	NWB Left lower extremity with knee scooter or walker. Keep cast clean and dry. Shower only, using plastic bag or seal tight cover to protect cast.

## 2018-03-02 NOTE — OCCUPATIONAL THERAPY INITIAL EVALUATION ADULT - RANGE OF MOTION EXAMINATION, UPPER EXTREMITY
Left UE Active ROM was WFL (within functional limits)/Right elbow-digits WFL's Right shld nt due +to right shld rotator cuff injury

## 2018-03-02 NOTE — DISCHARGE NOTE ADULT - HOSPITAL COURSE
This patient was admitted to [facility] with a history of Left Achilles tendon rupture.  Patient went to Pre-Surgical Testing at [facility] and was medically cleared to undergo elective procedure. Patient underwent Left Achilles tendon repair by Dr. Aminah Jones on 3/1/18. Procedure was well tolerated.  No operative or mary-operative complications arose during patients hospital course.  Patient received antibiotic according to SCIP guidelines for infection prevention.  Lovenox was given for DVT prophylaxis.  Anesthesia, Medical Hospitalist, Physical Therapy and Occupational Therapy were consulted. Patient is stable for discharge with a good prognosis.  Appropriate discharge instructions and medications are provided in this document.

## 2018-03-02 NOTE — PROGRESS NOTE ADULT - PROBLEM SELECTOR PLAN 1
- Pain control  - Bowel regimen  - PT/OT as per Ortho  - DVT PPx per Ortho  - Stable for DC from medicine perspective

## 2018-03-02 NOTE — PHYSICAL THERAPY INITIAL EVALUATION ADULT - MANUAL MUSCLE TESTING RESULTS, REHAB EVAL
grossly assessed due to
left ankle not tested, right shoulder not assessed due to pain/no strength deficits were identified

## 2018-03-02 NOTE — PROGRESS NOTE ADULT - SUBJECTIVE AND OBJECTIVE BOX
ORTHOPEDIC PA PROGRESS NOTE  TANIKA PACK      67y Male                                                                                                                               POD #1       STATUS POST:               Pre-Op Dx: Rupture of left Achilles tendon, initial encounter    Post-Op Dx:  Rupture of left Achilles tendon, initial encounter    Procedure: Achilles tendon repair                                              Patient comfortable pain controlled.  Voiding urine passing gas and tolerating p.o diet.  Patient has no compliants     Current Pain Management:  [ ] PCA   [x ] Po Analgesics [ ] IM /IV Anagesics     T(F): 97.9  HR: 60  BP: 110/62  RR: 16  SpO2: 95%                        13.8   10.0  )-----------( 238      ( 02 Mar 2018 07:19 )             43.5                     03-02    142  |  106  |  12  ----------------------------<  120<H>  4.7   |  27  |  1.03    Ca    9.4      02 Mar 2018 07:19      Physical Exam :    -   Well fitted SLC in place.  No skin breakdown noted.  cap refill 2+ sensation and motor not intact secondary ot popliteal block  A/P: 67y Male s/p Rupture of left Achilles tendon, initial encounter    -   Ortho Stable  -   Pain control   -   Medicine to follow  -   DVT ppx:     [ ]SCDs     [ ] ASA     [ ] Eliquis     [x ] Lovenox  -   Weight bearing status:  WBAT [ ]        PWB    [ ]     TTWB  [ ]      NWB  [x ]   -  Dispo:     Home [ x]   with home PT  Acute Rehab [ ]     ARIK [ ]     TBD [ ]
Ortho PA Addendum;  66 Y/O M S/P urgent Open Repair Left Achilles Tendon Rupture with FHL Tendon Graft & Graft Jacket graft. Casted. Is non weight bearing on Left LE. Pt has concurrent Right shoulder complete Rotator Cuff tear with extreme difficulty with Non weight bearing ambulation with crutches. He requires admission for Post-Op pain alleviation Left ankle & right shoulder, 24 H course of IV Antibiotics Post-OP with H/O DM, Lovenox for VTEP, and PT ambulatory training, stair training, with planning for ambulatory device aides. Discussed with Dr. Jones who agrees. Pt and wife agree with above.
INTERVAL HPI/OVERNIGHT EVENTS:   Patient seen and examined.  Pain controlled.  Eating, voiding, no BM yet.  No fevers, chills, sweats, cp, sob, n/v/d, abd pain, calf pain, or focal weakness.    REVIEW OF SYSTEMS:  See HPI,  all others negative    PHYSICAL EXAM:  Vital Signs Last 24 Hrs  T(C): 36.6 (02 Mar 2018 07:40), Max: 36.7 (01 Mar 2018 11:23)  T(F): 97.9 (02 Mar 2018 07:40), Max: 98.1 (01 Mar 2018 11:23)  HR: 60 (02 Mar 2018 07:40) (60 - 92)  BP: 110/62 (02 Mar 2018 07:40) (95/58 - 126/82)  BP(mean): --  RR: 16 (02 Mar 2018 07:40) (9 - 19)  SpO2: 95% (02 Mar 2018 07:40) (95% - 100%)    GENERAL: NAD, well-groomed, well-developed, awake, alert, oriented x 3, fluent and coherent speech  HEAD:  Atraumatic, Normocephalic  EYES: EOMI, PERRLA, conjunctiva and sclera clear  ENMT: No tonsillar erythema, exudates, or enlargement; Moist mucous membranes, Good dentition, No lesions  NECK: Supple, No JVD, No Cervical LAD, No thyromegaly, No thyroid nodules felt  NERVOUS SYSTEM:  Good concentration; Moving all 4 extremities - ROM limited at left knee and ankle due to hard cast, can wiggle toes; No gross sensory deficits, No facial droop  CHEST WALL: No masses  CHEST/LUNG: Clear to auscultation bilaterally; No rales, rhonchi, wheezing, or rubs  HEART: Regular rate and rhythm; No murmurs, rubs, or gallops  ABDOMEN: Soft, Nontender, Nondistended, Bowel sounds present, No palpable masses or organomegaly, No bruits  EXTREMITIES:  2+ Peripheral Pulses, No clubbing, cyanosis, or edema  LYMPH: No lymphadenopathy noted  SKIN: No rashes or lesions  INCISION: hard cast on left LE    LABS:                        13.8   10.0  )-----------( 238      ( 02 Mar 2018 07:19 )             43.5     02 Mar 2018 07:19    142    |  106    |  12     ----------------------------<  120    4.7     |  27     |  1.03     Ca    9.4        02 Mar 2018 07:19

## 2018-03-02 NOTE — DISCHARGE NOTE ADULT - PLAN OF CARE
Improve ambulation, ADLs and quality of life NWB Left lower extremity with knee scooter or walker. Keep cast clean and dry. Shower only, using plastic bag or seal tight cover to protect cast.

## 2018-03-02 NOTE — DISCHARGE NOTE ADULT - MEDICATION SUMMARY - MEDICATIONS TO TAKE
I will START or STAY ON the medications listed below when I get home from the hospital:    commode  -- 1   -- Indication: For equipment    rolling walker  -- Indication: For equipment    knee scooter  -- Indication: For equipment    Ecotrin  -- 81 milligram(s) by mouth once a day  -- Indication: For Home med and blood clot prevention    oxyCODONE 5 mg oral tablet  -- 1 tab(s) by mouth every 4 hours, As Needed for pain MDD:6  -- Caution federal law prohibits the transfer of this drug to any person other  than the person for whom it was prescribed.  It is very important that you take or use this exactly as directed.  Do not skip doses or discontinue unless directed by your doctor.  May cause drowsiness.  Alcohol may intensify this effect.  Use care when operating dangerous machinery.  This prescription cannot be refilled.  Using more of this medication than prescribed may cause serious breathing problems.    -- Indication: For pain    Tylenol 500 mg oral tablet  -- 2 tab(s) by mouth every 6 hours, As Needed for mild ankle pain  -- Indication: For pain    metformin 500 mg oral tablet  -- 1 tab(s) by mouth 2 times a day  -- Indication: For Home med    Januvia 100 mg oral tablet  -- 1 tab(s) by mouth once a day  -- Indication: For Home med    Crestor 10 mg oral tablet  -- 1 tab(s) by mouth once a day (at bedtime)  -- Indication: For Home med    MiraLax oral powder for reconstitution  -- 1 packet(s) by mouth once a day (at bedtime), As Needed only if constipated  -- Indication: For constipation    Colace 100 mg oral capsule  -- 1 cap(s) by mouth 2 times a day to prevent constipation. Stop for loose BMs  -- Indication: For constipation    Singulair 10 mg oral tablet  -- 1 tab(s) by mouth once a day  -- Indication: For Home med    Vitamin D3 2000 intl units oral tablet  -- 1 tab(s) by mouth once a day  -- Indication: For Home emd

## 2018-03-02 NOTE — PHYSICAL THERAPY INITIAL EVALUATION ADULT - CRITERIA FOR SKILLED THERAPEUTIC INTERVENTIONS
impairments found
anticipated equipment needs at discharge/anticipated discharge recommendation/impairments found/HCPT; Home with assist, RW, knee scooter, commode

## 2018-03-02 NOTE — PHYSICAL THERAPY INITIAL EVALUATION ADULT - ADDITIONAL COMMENTS
pvt home with 7STE w/ 1 HR, 6 steps inside with HR to bedroom bathroom. Pt has ax crutches. Pt has right shoulder pain/RTC tear as per pt. Son will be home to assist.

## 2018-03-06 ENCOUNTER — CHART COPY (OUTPATIENT)
Age: 68
End: 2018-03-06

## 2018-03-16 ENCOUNTER — APPOINTMENT (OUTPATIENT)
Dept: ORTHOPEDIC SURGERY | Facility: CLINIC | Age: 68
End: 2018-03-16
Payer: MEDICARE

## 2018-03-16 VITALS
DIASTOLIC BLOOD PRESSURE: 75 MMHG | WEIGHT: 206 LBS | HEIGHT: 69 IN | HEART RATE: 77 BPM | SYSTOLIC BLOOD PRESSURE: 105 MMHG | BODY MASS INDEX: 30.51 KG/M2

## 2018-03-16 DIAGNOSIS — F19.90 OTHER PSYCHOACTIVE SUBSTANCE USE, UNSPECIFIED, UNCOMPLICATED: ICD-10-CM

## 2018-03-16 PROCEDURE — 99024 POSTOP FOLLOW-UP VISIT: CPT

## 2018-03-16 PROCEDURE — 29405 APPL SHORT LEG CAST: CPT | Mod: 58,LT

## 2018-04-20 ENCOUNTER — APPOINTMENT (OUTPATIENT)
Dept: ORTHOPEDIC SURGERY | Facility: CLINIC | Age: 68
End: 2018-04-20
Payer: MEDICARE

## 2018-04-20 VITALS
SYSTOLIC BLOOD PRESSURE: 122 MMHG | WEIGHT: 206 LBS | HEART RATE: 87 BPM | DIASTOLIC BLOOD PRESSURE: 79 MMHG | BODY MASS INDEX: 30.51 KG/M2 | HEIGHT: 69 IN

## 2018-04-20 PROCEDURE — 99024 POSTOP FOLLOW-UP VISIT: CPT

## 2018-04-20 RX ORDER — ALBUTEROL SULFATE 90 UG/1
108 (90 BASE) AEROSOL, METERED RESPIRATORY (INHALATION)
Qty: 18 | Refills: 0 | Status: ACTIVE | COMMUNITY
Start: 2018-03-13

## 2018-05-18 ENCOUNTER — APPOINTMENT (OUTPATIENT)
Dept: ORTHOPEDIC SURGERY | Facility: CLINIC | Age: 68
End: 2018-05-18
Payer: MEDICARE

## 2018-05-18 VITALS
SYSTOLIC BLOOD PRESSURE: 107 MMHG | DIASTOLIC BLOOD PRESSURE: 70 MMHG | HEART RATE: 79 BPM | BODY MASS INDEX: 30.51 KG/M2 | HEIGHT: 69 IN | WEIGHT: 206 LBS

## 2018-05-18 PROCEDURE — 99024 POSTOP FOLLOW-UP VISIT: CPT

## 2018-06-22 ENCOUNTER — APPOINTMENT (OUTPATIENT)
Dept: ORTHOPEDIC SURGERY | Facility: CLINIC | Age: 68
End: 2018-06-22
Payer: MEDICARE

## 2018-06-22 VITALS
HEART RATE: 75 BPM | HEIGHT: 69 IN | SYSTOLIC BLOOD PRESSURE: 110 MMHG | WEIGHT: 206 LBS | BODY MASS INDEX: 30.51 KG/M2 | DIASTOLIC BLOOD PRESSURE: 72 MMHG

## 2018-06-22 DIAGNOSIS — S86.012A STRAIN OF LEFT ACHILLES TENDON, INITIAL ENCOUNTER: ICD-10-CM

## 2018-06-22 PROCEDURE — 99214 OFFICE O/P EST MOD 30 MIN: CPT

## 2018-08-09 PROBLEM — J30.2 OTHER SEASONAL ALLERGIC RHINITIS: Chronic | Status: ACTIVE | Noted: 2018-02-13

## 2018-08-09 PROBLEM — E55.9 VITAMIN D DEFICIENCY, UNSPECIFIED: Chronic | Status: ACTIVE | Noted: 2018-02-13

## 2018-08-09 PROBLEM — M19.90 UNSPECIFIED OSTEOARTHRITIS, UNSPECIFIED SITE: Chronic | Status: ACTIVE | Noted: 2018-02-13

## 2018-08-09 PROBLEM — E66.9 OBESITY, UNSPECIFIED: Chronic | Status: ACTIVE | Noted: 2018-02-13

## 2018-08-09 PROBLEM — E11.9 TYPE 2 DIABETES MELLITUS WITHOUT COMPLICATIONS: Chronic | Status: ACTIVE | Noted: 2018-02-13

## 2018-08-20 ENCOUNTER — OUTPATIENT (OUTPATIENT)
Dept: OUTPATIENT SERVICES | Facility: HOSPITAL | Age: 68
LOS: 1 days | End: 2018-08-20
Payer: MEDICARE

## 2018-08-20 VITALS
OXYGEN SATURATION: 98 % | RESPIRATION RATE: 16 BRPM | HEIGHT: 69 IN | TEMPERATURE: 98 F | DIASTOLIC BLOOD PRESSURE: 74 MMHG | WEIGHT: 211.64 LBS | HEART RATE: 62 BPM | SYSTOLIC BLOOD PRESSURE: 112 MMHG

## 2018-08-20 DIAGNOSIS — M75.101 UNSPECIFIED ROTATOR CUFF TEAR OR RUPTURE OF RIGHT SHOULDER, NOT SPECIFIED AS TRAUMATIC: ICD-10-CM

## 2018-08-20 DIAGNOSIS — M75.21 BICIPITAL TENDINITIS, RIGHT SHOULDER: ICD-10-CM

## 2018-08-20 DIAGNOSIS — Z98.890 OTHER SPECIFIED POSTPROCEDURAL STATES: Chronic | ICD-10-CM

## 2018-08-20 DIAGNOSIS — Z98.89 OTHER SPECIFIED POSTPROCEDURAL STATES: Chronic | ICD-10-CM

## 2018-08-20 DIAGNOSIS — Z90.89 ACQUIRED ABSENCE OF OTHER ORGANS: Chronic | ICD-10-CM

## 2018-08-20 DIAGNOSIS — Z01.818 ENCOUNTER FOR OTHER PREPROCEDURAL EXAMINATION: ICD-10-CM

## 2018-08-20 LAB
ANION GAP SERPL CALC-SCNC: 7 MMOL/L — SIGNIFICANT CHANGE UP (ref 5–17)
BUN SERPL-MCNC: 17 MG/DL — SIGNIFICANT CHANGE UP (ref 7–23)
CALCIUM SERPL-MCNC: 9.2 MG/DL — SIGNIFICANT CHANGE UP (ref 8.4–10.5)
CHLORIDE SERPL-SCNC: 106 MMOL/L — SIGNIFICANT CHANGE UP (ref 96–108)
CO2 SERPL-SCNC: 28 MMOL/L — SIGNIFICANT CHANGE UP (ref 22–31)
CREAT SERPL-MCNC: 1.21 MG/DL — SIGNIFICANT CHANGE UP (ref 0.5–1.3)
GLUCOSE SERPL-MCNC: 112 MG/DL — HIGH (ref 70–99)
HBA1C BLD-MCNC: 6.4 % — HIGH (ref 4–5.6)
HCT VFR BLD CALC: 46.6 % — SIGNIFICANT CHANGE UP (ref 39–50)
HGB BLD-MCNC: 14.9 G/DL — SIGNIFICANT CHANGE UP (ref 13–17)
MCHC RBC-ENTMCNC: 27.1 PG — SIGNIFICANT CHANGE UP (ref 27–34)
MCHC RBC-ENTMCNC: 32 GM/DL — SIGNIFICANT CHANGE UP (ref 32–36)
MCV RBC AUTO: 84.7 FL — SIGNIFICANT CHANGE UP (ref 80–100)
NRBC # BLD: 0 /100 WBCS — SIGNIFICANT CHANGE UP (ref 0–0)
PLATELET # BLD AUTO: 210 K/UL — SIGNIFICANT CHANGE UP (ref 150–400)
POTASSIUM SERPL-MCNC: 4.3 MMOL/L — SIGNIFICANT CHANGE UP (ref 3.5–5.3)
POTASSIUM SERPL-SCNC: 4.3 MMOL/L — SIGNIFICANT CHANGE UP (ref 3.5–5.3)
RBC # BLD: 5.5 M/UL — SIGNIFICANT CHANGE UP (ref 4.2–5.8)
RBC # FLD: 15.4 % — HIGH (ref 10.3–14.5)
SODIUM SERPL-SCNC: 141 MMOL/L — SIGNIFICANT CHANGE UP (ref 135–145)
WBC # BLD: 5.96 K/UL — SIGNIFICANT CHANGE UP (ref 3.8–10.5)
WBC # FLD AUTO: 5.96 K/UL — SIGNIFICANT CHANGE UP (ref 3.8–10.5)

## 2018-08-20 PROCEDURE — 80048 BASIC METABOLIC PNL TOTAL CA: CPT

## 2018-08-20 PROCEDURE — 85027 COMPLETE CBC AUTOMATED: CPT

## 2018-08-20 PROCEDURE — 83036 HEMOGLOBIN GLYCOSYLATED A1C: CPT

## 2018-08-20 PROCEDURE — G0463: CPT

## 2018-08-20 PROCEDURE — 93005 ELECTROCARDIOGRAM TRACING: CPT

## 2018-08-20 PROCEDURE — 93010 ELECTROCARDIOGRAM REPORT: CPT

## 2018-08-20 RX ORDER — POLYETHYLENE GLYCOL 3350 17 G/17G
1 POWDER, FOR SOLUTION ORAL
Qty: 0 | Refills: 0 | COMMUNITY

## 2018-08-20 RX ORDER — ACETAMINOPHEN 500 MG
2 TABLET ORAL
Qty: 0 | Refills: 0 | COMMUNITY

## 2018-08-20 RX ORDER — DOCUSATE SODIUM 100 MG
1 CAPSULE ORAL
Qty: 0 | Refills: 0 | COMMUNITY

## 2018-08-20 NOTE — H&P PST ADULT - FAMILY HISTORY
Family history of stomach cancer     Sibling  Still living? Yes, Estimated age: 51-60  Family history of lung cancer, Age at diagnosis: Age Unknown  Family history of drug addiction, Age at diagnosis: Age Unknown  Family history of coronary artery disease in sister, Age at diagnosis: Age Unknown  Family history of hypertension in brother, Age at diagnosis: Age Unknown

## 2018-08-20 NOTE — H&P PST ADULT - PSH
S/P Achilles tendon repair  left 3/2018  S/P colonoscopy  2013  S/P hernia repair  30 years ago  S/P laminectomy  and fusion - 2014 - L4-5  S/P tonsillectomy

## 2018-08-20 NOTE — H&P PST ADULT - HISTORY OF PRESENT ILLNESS
69 yo male presents with 3-4 year history of right shoulder pain, no precipitating event.  Pain increases with any ROM and patient states that he is unable to sleep at night.  Mild relief with diclofenac. 67 yo male presents with 3-4 year history of right shoulder pain, no precipitating event.  Pain increases with any ROM and patient states that he is unable to sleep at night.  Mild relief with diclofenac, which patient will stop 1 week pre op.

## 2018-08-20 NOTE — H&P PST ADULT - PROBLEM SELECTOR PLAN 1
RIGHT SHOULDER ARTHROSCOPY SUBACROMIAL DECOMPRESSION ACROMIO-CLAVICULAR JOINT RESECTION POSSIBLE ROTATOR CUFF REPAIR POSSIBLE BICEPS TENOTOMY

## 2018-08-24 ENCOUNTER — APPOINTMENT (OUTPATIENT)
Dept: ORTHOPEDIC SURGERY | Facility: CLINIC | Age: 68
End: 2018-08-24
Payer: MEDICARE

## 2018-08-24 VITALS
SYSTOLIC BLOOD PRESSURE: 98 MMHG | HEIGHT: 69 IN | WEIGHT: 206 LBS | DIASTOLIC BLOOD PRESSURE: 64 MMHG | HEART RATE: 67 BPM | BODY MASS INDEX: 30.51 KG/M2

## 2018-08-24 DIAGNOSIS — Z98.890 OTHER SPECIFIED POSTPROCEDURAL STATES: ICD-10-CM

## 2018-08-24 PROCEDURE — 99214 OFFICE O/P EST MOD 30 MIN: CPT

## 2018-09-05 ENCOUNTER — TRANSCRIPTION ENCOUNTER (OUTPATIENT)
Age: 68
End: 2018-09-05

## 2018-09-06 ENCOUNTER — APPOINTMENT (OUTPATIENT)
Dept: ORTHOPEDIC SURGERY | Facility: HOSPITAL | Age: 68
End: 2018-09-06

## 2018-09-06 ENCOUNTER — OUTPATIENT (OUTPATIENT)
Dept: OUTPATIENT SERVICES | Facility: HOSPITAL | Age: 68
LOS: 1 days | End: 2018-09-06
Payer: MEDICARE

## 2018-09-06 ENCOUNTER — RESULT REVIEW (OUTPATIENT)
Age: 68
End: 2018-09-06

## 2018-09-06 VITALS
DIASTOLIC BLOOD PRESSURE: 83 MMHG | HEIGHT: 69 IN | SYSTOLIC BLOOD PRESSURE: 111 MMHG | RESPIRATION RATE: 18 BRPM | OXYGEN SATURATION: 100 % | TEMPERATURE: 98 F | WEIGHT: 206.79 LBS | HEART RATE: 66 BPM

## 2018-09-06 VITALS
OXYGEN SATURATION: 96 % | DIASTOLIC BLOOD PRESSURE: 66 MMHG | HEART RATE: 76 BPM | SYSTOLIC BLOOD PRESSURE: 113 MMHG | RESPIRATION RATE: 14 BRPM

## 2018-09-06 DIAGNOSIS — M75.21 BICIPITAL TENDINITIS, RIGHT SHOULDER: ICD-10-CM

## 2018-09-06 DIAGNOSIS — Z98.89 OTHER SPECIFIED POSTPROCEDURAL STATES: Chronic | ICD-10-CM

## 2018-09-06 DIAGNOSIS — Z98.890 OTHER SPECIFIED POSTPROCEDURAL STATES: Chronic | ICD-10-CM

## 2018-09-06 DIAGNOSIS — Z90.89 ACQUIRED ABSENCE OF OTHER ORGANS: Chronic | ICD-10-CM

## 2018-09-06 LAB — GLUCOSE BLDC GLUCOMTR-MCNC: 89 MG/DL — SIGNIFICANT CHANGE UP (ref 70–99)

## 2018-09-06 PROCEDURE — 29827 SHO ARTHRS SRG RT8TR CUF RPR: CPT | Mod: RT

## 2018-09-06 PROCEDURE — 82962 GLUCOSE BLOOD TEST: CPT

## 2018-09-06 PROCEDURE — C1713: CPT

## 2018-09-06 PROCEDURE — 29826 SHO ARTHRS SRG DECOMPRESSION: CPT | Mod: RT

## 2018-09-06 PROCEDURE — 29824 SHO ARTHRS SRG DSTL CLAVICLC: CPT | Mod: RT

## 2018-09-06 PROCEDURE — 88304 TISSUE EXAM BY PATHOLOGIST: CPT

## 2018-09-06 PROCEDURE — 88304 TISSUE EXAM BY PATHOLOGIST: CPT | Mod: 26

## 2018-09-06 RX ORDER — ONDANSETRON 8 MG/1
4 TABLET, FILM COATED ORAL ONCE
Qty: 0 | Refills: 0 | Status: DISCONTINUED | OUTPATIENT
Start: 2018-09-06 | End: 2018-09-06

## 2018-09-06 RX ORDER — FENTANYL CITRATE 50 UG/ML
25 INJECTION INTRAVENOUS
Qty: 0 | Refills: 0 | Status: DISCONTINUED | OUTPATIENT
Start: 2018-09-06 | End: 2018-09-06

## 2018-09-06 RX ORDER — CEFAZOLIN SODIUM 1 G
2000 VIAL (EA) INJECTION ONCE
Qty: 0 | Refills: 0 | Status: COMPLETED | OUTPATIENT
Start: 2018-09-06 | End: 2018-09-06

## 2018-09-06 RX ORDER — OXYCODONE AND ACETAMINOPHEN 5; 325 MG/1; MG/1
1 TABLET ORAL ONCE
Qty: 0 | Refills: 0 | Status: DISCONTINUED | OUTPATIENT
Start: 2018-09-06 | End: 2018-09-06

## 2018-09-06 RX ORDER — HYDROMORPHONE HYDROCHLORIDE 2 MG/ML
0.5 INJECTION INTRAMUSCULAR; INTRAVENOUS; SUBCUTANEOUS
Qty: 0 | Refills: 0 | Status: DISCONTINUED | OUTPATIENT
Start: 2018-09-06 | End: 2018-09-06

## 2018-09-06 RX ORDER — CHLORHEXIDINE GLUCONATE 213 G/1000ML
1 SOLUTION TOPICAL ONCE
Qty: 0 | Refills: 0 | Status: COMPLETED | OUTPATIENT
Start: 2018-09-06 | End: 2018-09-06

## 2018-09-06 RX ORDER — SODIUM CHLORIDE 9 MG/ML
1000 INJECTION, SOLUTION INTRAVENOUS
Qty: 0 | Refills: 0 | Status: DISCONTINUED | OUTPATIENT
Start: 2018-09-06 | End: 2018-09-06

## 2018-09-06 RX ADMIN — CHLORHEXIDINE GLUCONATE 1 APPLICATION(S): 213 SOLUTION TOPICAL at 09:03

## 2018-09-06 RX ADMIN — SODIUM CHLORIDE 50 MILLILITER(S): 9 INJECTION, SOLUTION INTRAVENOUS at 13:25

## 2018-09-06 NOTE — BRIEF OPERATIVE NOTE - PRE-OP DX
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

## 2018-09-06 NOTE — BRIEF OPERATIVE NOTE - PROCEDURE
<<-----Click on this checkbox to enter Procedure Tenotomy, biceps  09/06/2018  Right  Active  Aneesh Estrada  Subacromial decompression  09/06/2018  Right  Active  Aneesh Estrada  Resection, joint, acromioclavicular  09/06/2018  Right  Active  Aneesh Estrada Arthroscopic repair of rotator cuff of shoulder  09/06/2018  right  Active  [id number]

## 2018-09-06 NOTE — ASU DISCHARGE PLAN (ADULT/PEDIATRIC). - SPECIAL INSTRUCTIONS
Follow all verbal and written instructions. Take medications as prescribed. DO NOT drive, operate machinery, and/or make important decisions while on prescription pain medication. DO NOT hesitate to call Doctor's office with questions or concerns.;  - Call your doctor if you experience:  • An increase in pain not controlled by pain medication or change in activity or  position.  • Temperature greater than 101° F.  • Redness, increased swelling or foul smelling drainage from or around the  incision.  • Numbness, tingling or a change in color or temperature of the operative extremity.  • Call your doctor immediately if you experience chest pain, shortness of breath or calf pain.

## 2018-09-06 NOTE — ASU DISCHARGE PLAN (ADULT/PEDIATRIC). - MEDICATION SUMMARY - MEDICATIONS TO TAKE
I will START or STAY ON the medications listed below when I get home from the hospital:    Ecotrin  -- 81 milligram(s) by mouth once a day  -- Indication: For Home med    Percocet 5/325 oral tablet  -- 1 tab(s) by mouth every 6 hours, As Needed MDD:6   -- Caution federal law prohibits the transfer of this drug to any person other  than the person for whom it was prescribed.  May cause drowsiness.  Alcohol may intensify this effect.  Use care when operating dangerous machinery.  This prescription cannot be refilled.  This product contains acetaminophen.  Do not use  with any other product containing acetaminophen to prevent possible liver damage.  Using more of this medication than prescribed may cause serious breathing problems.    -- Indication: For Pain    diclofenac potassium 25 mg oral capsule  -- 1 cap(s) by mouth prn  stop 1 week pre op  -- Indication: For Home med    metFORMIN 500 mg oral tablet  -- 1 tab(s) by mouth 2 times a day  -- Indication: For Home med    Januvia 100 mg oral tablet  -- 1 tab(s) by mouth once a day  -- Indication: For Home med    Crestor 10 mg oral tablet  -- 1 tab(s) by mouth once a day (at bedtime)  -- Indication: For Home med    Singulair 10 mg oral tablet  -- 1 tab(s) by mouth once a day  -- Indication: For Home med    Vitamin D3 2000 intl units oral tablet  -- 1 tab(s) by mouth once a day  -- Indication: For Home med

## 2018-09-06 NOTE — ASU DISCHARGE PLAN (ADULT/PEDIATRIC). - NOTIFY
Bleeding that does not stop/Unable to Urinate/Pain not relieved by Medications/Numbness, color, or temperature change to extremity/Fever greater than 101

## 2018-09-10 LAB — SURGICAL PATHOLOGY FINAL REPORT - CH: SIGNIFICANT CHANGE UP

## 2018-09-18 ENCOUNTER — APPOINTMENT (OUTPATIENT)
Dept: ORTHOPEDIC SURGERY | Facility: CLINIC | Age: 68
End: 2018-09-18
Payer: MEDICARE

## 2018-09-18 VITALS
WEIGHT: 206 LBS | HEART RATE: 68 BPM | BODY MASS INDEX: 30.51 KG/M2 | DIASTOLIC BLOOD PRESSURE: 65 MMHG | SYSTOLIC BLOOD PRESSURE: 100 MMHG | HEIGHT: 69 IN

## 2018-09-18 DIAGNOSIS — Z89.239 ACQUIRED ABSENCE OF UNSPECIFIED SHOULDER: ICD-10-CM

## 2018-09-18 PROCEDURE — 73030 X-RAY EXAM OF SHOULDER: CPT | Mod: RT

## 2018-09-18 PROCEDURE — 99024 POSTOP FOLLOW-UP VISIT: CPT

## 2018-10-30 ENCOUNTER — APPOINTMENT (OUTPATIENT)
Dept: ORTHOPEDIC SURGERY | Facility: CLINIC | Age: 68
End: 2018-10-30
Payer: MEDICARE

## 2018-10-30 VITALS
DIASTOLIC BLOOD PRESSURE: 77 MMHG | HEIGHT: 69 IN | HEART RATE: 66 BPM | WEIGHT: 205 LBS | BODY MASS INDEX: 30.36 KG/M2 | SYSTOLIC BLOOD PRESSURE: 117 MMHG

## 2018-10-30 PROCEDURE — 99024 POSTOP FOLLOW-UP VISIT: CPT

## 2018-10-30 RX ORDER — OXYCODONE AND ACETAMINOPHEN 5; 325 MG/1; MG/1
5-325 TABLET ORAL
Qty: 60 | Refills: 0 | Status: DISCONTINUED | COMMUNITY
Start: 2018-09-13 | End: 2018-10-30

## 2018-10-30 RX ORDER — PREDNISONE 10 MG/1
10 TABLET ORAL
Qty: 21 | Refills: 0 | Status: DISCONTINUED | COMMUNITY
Start: 2017-12-15 | End: 2018-10-30

## 2018-10-30 RX ORDER — OXYCODONE 5 MG/1
5 TABLET ORAL
Qty: 30 | Refills: 0 | Status: DISCONTINUED | COMMUNITY
Start: 2018-03-01 | End: 2018-10-30

## 2018-10-30 RX ORDER — NAPROXEN 500 MG/1
500 TABLET ORAL
Qty: 60 | Refills: 0 | Status: DISCONTINUED | COMMUNITY
Start: 2017-09-17 | End: 2018-10-30

## 2018-12-18 ENCOUNTER — APPOINTMENT (OUTPATIENT)
Dept: ORTHOPEDIC SURGERY | Facility: CLINIC | Age: 68
End: 2018-12-18
Payer: MEDICARE

## 2018-12-18 VITALS
HEART RATE: 72 BPM | BODY MASS INDEX: 30.36 KG/M2 | WEIGHT: 205 LBS | SYSTOLIC BLOOD PRESSURE: 123 MMHG | HEIGHT: 69 IN | DIASTOLIC BLOOD PRESSURE: 77 MMHG

## 2018-12-18 DIAGNOSIS — Z98.890 OTHER SPECIFIED POSTPROCEDURAL STATES: ICD-10-CM

## 2018-12-18 PROCEDURE — 99213 OFFICE O/P EST LOW 20 MIN: CPT

## 2019-02-19 ENCOUNTER — APPOINTMENT (OUTPATIENT)
Dept: ORTHOPEDIC SURGERY | Facility: CLINIC | Age: 69
End: 2019-02-19

## 2020-08-27 NOTE — OCCUPATIONAL THERAPY INITIAL EVALUATION ADULT - HEALTH SCREEN CRITERIA
Subjective:       Patient ID: Teodoro Mast is a 75 y.o. Black or  male who presents for follow up of CKD.    HPI     Mr. Mast was seen for follow up of CKD. He was seen on 9/6/16 in new patient evaluation and last followed on 9/17/19. On 11/12/19 he was followed by [facility].    Interim his labs show worsening of kidney function. Pre clinic labs showing KINGS superimposed on CKD IV. He admits he does not drink enough fluids. He brought his home BP readings, noted in 120-130's range, occasionally SBP in 110 range. He denies any orthostatic symptoms. He has enlarged prostate. He feels at times problems with emptying of the bladder and has noticed difficulty emptying when urinating in standing position.     He has been on multiple antihypertensive medicines. He has been on Torsemide 20 mg per day. He denies any leg swelling/ SOB. He states previously he was on HCTZ which was not effective in controlling BP. Hence his cardiologist changed to Torsemide.    He has been watching his blood glucose. So he states he tends to eat smaller meal at dinner time. He denies any nausea/ vomiting/ diarrhea/ fever/ chills/ night sweats/ SOB/ flank pain/ cloudy urine/ bone pain.    He denies any NSAID use recently. He does not have a recent exposure to IV iodine contrast.       Pt has longstanding hypertension since 1974, remote h/o tobacco smoking, hyperlipidemia, BPH, CKD, proteinuria. He reports family h/o kidney disease in his father that was not diagnosed until he was in his 80's. Pt reports he was diagnosed with hypertension when he was 29 years old. He reports in the past he had some obstruction to the urinary tract but unable to provide any details.    In the past he was using medicines like Indomethacin, ibuprofen. He has been diagnosed with sleep apnea. He is using CPAP regularly.    He also reports his sleep quality is better with CPAP machine.    Of note, he saw his PCP/ cardiologist in January 2019 when his  BP was noted to be 190's/90's. He was started on torsemide by his cardiologist for better BP control. Pre clinic that time creatinine was 2.0, it did rise to 2.4 in February 2019 labs along with rise in BUN to 39. His cardiologist reduced the dose of diuretic in response to this. But overall, he has progression of CKD since then.    He reports he had prior episodes of gout and he feels intake of beer, craw fish triggers it for him. He has been taking allopurinol regularly which has helped lower the uric acid levels gradually.    He has slow progression of CKD. Labs were reviewed and d/w him. His creatinine has gradually increased from 1.4 to 1.9 since 2010. Prior urine studies show proteinuria. He appears to have chronic anemia.     prior labs showed SPEP/ SHELIA was normal. hep B/C screen was negative. US kidneys from 10/16 showed 10.1 and 12.9 cm kidneys, changes of CKD, cortical thinning, multiple cysts on both sides and enlarged prostate.     Renal Function:  Lab Results   Component Value Date     08/24/2020     03/24/2020     08/24/2020     03/24/2020    K 4.7 08/24/2020    K 4.3 03/24/2020     (H) 08/24/2020     (H) 03/24/2020    CO2 23 08/24/2020    CO2 20 (L) 03/24/2020    BUN 41 (H) 08/24/2020    BUN 30 (H) 03/24/2020    CALCIUM 8.4 (L) 08/24/2020    CALCIUM 8.2 (L) 03/24/2020    CREATININE 3.0 (H) 08/24/2020    CREATININE 2.5 (H) 03/24/2020    ALBUMIN 3.3 (L) 08/24/2020    ALBUMIN 3.0 (L) 03/24/2020    PHOS 3.2 08/24/2020    PHOS 2.9 03/24/2020    ESTGFRAFRICA 22.8 (A) 08/24/2020    ESTGFRAFRICA 28.6 (A) 03/24/2020    EGFRNONAA 19.7 (A) 08/24/2020    EGFRNONAA 24.7 (A) 03/24/2020       Urinalysis:  Lab Results   Component Value Date    APPEARANCEUA Clear 08/24/2020    PHUR 5.0 08/24/2020    SPECGRAV 1.010 08/24/2020    PROTEINUA 2+ (A) 08/24/2020    GLUCUA Negative 08/24/2020    OCCULTUA Negative 08/24/2020    NITRITE Negative 08/24/2020    LEUKOCYTESUR Negative  yes 08/24/2020       Protein/Creatinine Ratio:  Lab Results   Component Value Date    PROTEINURINE 136 (H) 08/24/2020    CREATRANDUR 110.0 08/24/2020    UTPCR 1.24 (H) 08/24/2020       CBC:  Lab Results   Component Value Date    WBC 5.25 02/13/2020    HGB 10.8 (L) 02/13/2020    HCT 35.6 (L) 02/13/2020       Vit D 32  Uric acid 7.7    US Kidneys 6/2018  10.4 and 13.6 cm kidneys, cortical thinning, loss of corticomedullary distinction, multiple cysts on both kidneys, prostatomegaly.    Review of Systems   Constitutional: Negative for activity change, appetite change, chills, fatigue and fever.   Eyes: Negative for pain and discharge.   Respiratory: Negative for cough, shortness of breath and wheezing.    Cardiovascular: Negative for chest pain and leg swelling.   Gastrointestinal: Negative for abdominal pain, diarrhea, nausea and vomiting.   Endocrine: Negative for polydipsia and polyuria.   Genitourinary: Negative for decreased urine volume, difficulty urinating, dysuria, flank pain, frequency and hematuria.   Musculoskeletal: Negative for back pain and myalgias.   Skin: Negative for pallor and rash.   Neurological: Negative for dizziness, light-headedness and headaches.   Psychiatric/Behavioral: The patient is not nervous/anxious.        Objective:      Physical Exam   Constitutional: He is oriented to person, place, and time. He appears well-developed and well-nourished. No distress.   Eyes: Right eye exhibits no discharge. Left eye exhibits no discharge.   Neck: Neck supple.   Cardiovascular: Normal rate, regular rhythm and normal heart sounds.   Pulmonary/Chest: Effort normal. No respiratory distress. He has no wheezes. He has no rales.   Musculoskeletal: He exhibits no edema.   Neurological: He is alert and oriented to person, place, and time.   Skin: Skin is warm. He is not diaphoretic.   Psychiatric: He has a normal mood and affect. His behavior is normal.   Vitals reviewed.      Assessment:       1. KINGS  (acute kidney injury)    2. CKD (chronic kidney disease) stage 4, GFR 15-29 ml/min    3. Hypertensive kidney disease with stage 4 chronic kidney disease    4. Kidney cysts    5. BPH with urinary obstruction    6. Metabolic acidosis    7. Persistent proteinuria    8. Secondary hyperparathyroidism    9. Vitamin D deficiency    10. Hypertension, unspecified type        Plan:     Mr. Mast has longstanding hypertension, hyperlipidemia, BPH, CKD with sub nephrotic proteinuria. He has hypertensive glomerulosclerosis causing CKD as well has NSAID induced kidney damage. He denies any NSAID use currently but it appears in the past he was taking indomethacin. He has slow progression of CKD. Also he has slow progression of proteinuria. Most recently he had uncontrolled hypertension which could have led to progression of CKD and proteinuria. As such he has hypertension since 1970's.     He developed KINGS in January/ February which is likely due to diuretic dosing which was added to address his severely uncontrolled hypertension around that time. Overall since then he does have further somewhat rapid progression of CKD. I brought this to his attention. Stressed need for better BP control. Ideally his SBP should be < 130 with sub nephrotic proteinuria he has been having. Overall his BP control was sub optimal for quite some time.    Since adding allopurinol at a lower dose his hyperuricemia seems to have improved. Continue current dose for now.    I explained to him about CKD staging, potential for progression, risk factors for CKD etc. Stressed importance of good BP control, low salt diet, keeping hydrated, avoiding NSAID, nephrotoxins etc. Previously noted, cysts on both kidneys and changes of CKD on US.     will follow K levels closely. Pt advised to avoid high K containing foods.     He has KINGS superimposed on CKD IV. This is clinically suspected due to pre renal azotemia/ volume depletion. He admits to not drinking enough  fluids. Given difficulty with emptying the bladder, will obtain urgent US kidneys to rule out obstruction and rule out urinary retention. Pt advised to see urologist sooner. Also will obtain labs to rule out paraprotein.       Plan  Labs today  Rule our paraprotein  Trend proteinuria  Repeat US kidneys   Rule out obstruction/ urinary retention  Return to see urologist sooner  Concern for volume depletion vs progression of CKD, rule out obstruction/ urinary retention as a cause of KINGS  Explained this could be still progression of CKD and he as such has CKD onset > 10 years ago. Interim he did have problems with BP control which could have accelerated CKD progression.    Continue sodium bicarbonate at current dose, met acidosis stable    Trend uric acid level  continue allopurinol, renal dosing    Add calcitriol 0.25 mcg daily for sec hyperparathyroidism  - follow PTH levels, vit D, decrease OTC vitamin D, follow corrected Ca    Continue vit D OTC    - periodically monitor renal panel for electrolytes, acid base status, eGFR  - risk of CKD progression d/w patient  - low salt diet  - trend urine studies for proteinuria  - avoid NSAID/ bactrim/ IV contrast/ gadolinium/ aminoglycoside where possible  - he has chronic anemia, periodically trend Hb  - continue to follow with urology, continue Flomax. He has prostate enlargement and elevated PSA for several years. He has occasional microhematuria seen on urine studies    Continue current antihypertensives with diuretic and lisinopril, use caution given worsened kidney function, more frequent labs to monitor for electrolyte abnormalities/ acid base changes and possible change in Rx if worsening of pre renal azotemia/ hyperkalemia      refer for AVF creation if eGFR stays < 20, pt made aware    He verbalized understanding.  RTC 4 weeks      Total time spent was 55 minutes with >50% time spent in face to face evaluation, counseling about possible etiology, work up, monitoring,  natural course of illness, recommendations.     Plan, labs, recommendations were discussed with patient, his questions were answered to his satisfaction.     Addendum  Post clinic labs showed slight further worsening of kidney function. Pt was communicated about this and US read showing mild post void residual. Prerenal azotemia noted, BUN 44.  Pt was instructed to  - hold torsemide for the next 3 days  - continue home BP monitoring  - increase labetalol dose potentially to 600 mg in the morning and 300 mg at night from current 300 mg twice per day, if SBP > 140 mm hg  - pt to see urologist sooner given small post void residual  - repeat renal panel off Torsemide on Monday 8/31/20  - kappa free light chains elevated, although kappa/ lambda ratio around the same, slight elevation could be due to CKD. However given rise in free light chain levels as such we will refer to hematology.

## 2021-01-04 NOTE — H&P PST ADULT - FUNCTIONAL LEVEL PRIOR: DRESSING
Pt advised of u/s results as well as having a subchorionic hemorrhage. Informed to call if she should have any type of spotting or bleeding in the future.   Forwarded call to PSR to set up 1st OB appnt.    (0) independent

## 2021-10-18 NOTE — H&P PST ADULT - NS PRO REGISTERED ORGAN DONOR
[General Appearance - Alert] : alert [General Appearance - In No Acute Distress] : in no acute distress [General Appearance - Well Developed] : well developed [General Appearance - Well Nourished] : well nourished [Sclera] : the sclera and conjunctiva were normal [PERRL With Normal Accommodation] : pupils were equal in size, round, and reactive to light [Extraocular Movements] : extraocular movements were intact [Strabismus] : no strabismus was seen [Normal Oral Mucosa] : normal oral mucosa [No Oral Pallor] : no oral pallor [Outer Ear] : the ears and nose were normal in appearance [Neck Appearance] : the appearance of the neck was normal [Neck Cervical Mass (___cm)] : no neck mass was observed [Jugular Venous Distention Increased] : there was no jugular-venous distention [Respiration, Rhythm And Depth] : normal respiratory rhythm and effort [Exaggerated Use Of Accessory Muscles For Inspiration] : no accessory muscle use [Auscultation Breath Sounds / Voice Sounds] : lungs were clear to auscultation bilaterally [Heart Rate And Rhythm] : heart rate was normal and rhythm regular [Heart Sounds] : normal S1 and S2 [Heart Sounds Gallop] : no gallops [Heart Sounds Pericardial Friction Rub] : no pericardial rub [Bowel Sounds] : normal bowel sounds [Abdomen Soft] : soft [Abdomen Tenderness] : non-tender [Prostate Tenderness] : the prostate was not tender [Cervical Lymph Nodes Enlarged Posterior Bilaterally] : posterior cervical [Cervical Lymph Nodes Enlarged Anterior Bilaterally] : anterior cervical [No CVA Tenderness] : no ~M costovertebral angle tenderness [No Spinal Tenderness] : no spinal tenderness [Abnormal Walk] : normal gait [Musculoskeletal - Swelling] : no joint swelling seen [Skin Color & Pigmentation] : normal skin color and pigmentation [Skin Turgor] : normal skin turgor [] : no rash [Skin Lesions] : no skin lesions [FreeTextEntry1] : decreased patellar reflexes BL [Oriented To Time, Place, And Person] : oriented to person, place, and time [Affect] : the affect was normal [Mood] : the mood was normal No

## 2021-11-18 DIAGNOSIS — M25.552 PAIN IN RIGHT HIP: ICD-10-CM

## 2021-11-18 DIAGNOSIS — M25.551 PAIN IN RIGHT HIP: ICD-10-CM

## 2021-11-19 ENCOUNTER — APPOINTMENT (OUTPATIENT)
Dept: ORTHOPEDIC SURGERY | Facility: CLINIC | Age: 71
End: 2021-11-19
Payer: MEDICARE

## 2021-11-19 VITALS
DIASTOLIC BLOOD PRESSURE: 78 MMHG | WEIGHT: 205 LBS | HEART RATE: 74 BPM | SYSTOLIC BLOOD PRESSURE: 129 MMHG | BODY MASS INDEX: 30.36 KG/M2 | HEIGHT: 69 IN

## 2021-11-19 DIAGNOSIS — M25.552 PAIN IN LEFT HIP: ICD-10-CM

## 2021-11-19 PROCEDURE — 73502 X-RAY EXAM HIP UNI 2-3 VIEWS: CPT | Mod: LT

## 2021-11-19 PROCEDURE — 99214 OFFICE O/P EST MOD 30 MIN: CPT

## 2021-11-19 NOTE — PHYSICAL EXAM
[LE] : Sensory: Intact in bilateral lower extremities [DP] : dorsalis pedis 2+ and symmetric bilaterally [PT] : posterior tibial 2+ and symmetric bilaterally [Obese] : obese [Normal] : no peripheral adenopathy appreciated [de-identified] : Examination both hips: the skin is warm and dry no lesions is no bony or soft tissue tenderness leg lengths are grossly equal range of motion of both hips is well maintained hip flexion 0-110 external rotation 0-40 internal rotation 0-30 patient is able to straight leg raise without difficulty. [de-identified] : X-rays from outside facility were viewed is no significant arthritic changes of either hip no acute bony abnormalities no sign Of avascular necrosis incidentally noted is hardware in his lumbar spine

## 2021-11-19 NOTE — DISCUSSION/SUMMARY
[Medication Risks Reviewed] : Medication risks reviewed [Surgical risks reviewed] : Surgical risks reviewed [de-identified] : Bilateral hip pain\par \par \par \par \par \par Treatment options were reviewed the patient at this time his left hip is worse on his right leg recommend obtaining an MRI to evaluate for avascular process versus a labral tear versus arthritic changes present agreement with the plan we'll see him back after his MRI to discuss his results in the meantime he'll continue with over-the-counter anti-inflammatory medications activity modification etc.\par \par a total of 30 minutes of face-to-face time spent with the patient discussed the nature of his diagnosis and treatment options.

## 2021-11-19 NOTE — HISTORY OF PRESENT ILLNESS
[de-identified] : 71-year-old male presents complaining of bilateral hip pain it started about 8 months ago with no recent injury. He notes the pain is in the groin and anterior thigh area. His pain is at a 10 worse with activity especially bending and squatting type maneuvers. He does have a history of lumbar fusion in 2014 denies back pain at this visit. He denies fever chills night sweats is no procedures autoimmune orthopedic complaints is currently ambulated without assistive devices he has no difficulty putting shoes and socks on. He is not currently taking any anti-inflammatory medications as and no other treatments for his hips no physical therapy or injections. He is diabetic his last hemoglobin A1c was 6 down from 9.

## 2021-11-29 ENCOUNTER — OUTPATIENT (OUTPATIENT)
Dept: OUTPATIENT SERVICES | Facility: HOSPITAL | Age: 71
LOS: 1 days | End: 2021-11-29

## 2021-11-29 ENCOUNTER — APPOINTMENT (OUTPATIENT)
Dept: MRI IMAGING | Facility: CLINIC | Age: 71
End: 2021-11-29
Payer: MEDICARE

## 2021-11-29 DIAGNOSIS — Z98.89 OTHER SPECIFIED POSTPROCEDURAL STATES: Chronic | ICD-10-CM

## 2021-11-29 DIAGNOSIS — M25.552 PAIN IN LEFT HIP: ICD-10-CM

## 2021-11-29 DIAGNOSIS — Z90.89 ACQUIRED ABSENCE OF OTHER ORGANS: Chronic | ICD-10-CM

## 2021-11-29 DIAGNOSIS — Z98.890 OTHER SPECIFIED POSTPROCEDURAL STATES: Chronic | ICD-10-CM

## 2021-11-29 PROCEDURE — G1004: CPT

## 2021-11-29 PROCEDURE — 73721 MRI JNT OF LWR EXTRE W/O DYE: CPT | Mod: 26,LT,ME

## 2021-12-13 ENCOUNTER — TRANSCRIPTION ENCOUNTER (OUTPATIENT)
Age: 71
End: 2021-12-13

## 2022-02-18 ENCOUNTER — APPOINTMENT (OUTPATIENT)
Dept: ORTHOPEDIC SURGERY | Facility: CLINIC | Age: 72
End: 2022-02-18
Payer: MEDICARE

## 2022-02-18 VITALS
HEART RATE: 81 BPM | WEIGHT: 205 LBS | HEIGHT: 69 IN | DIASTOLIC BLOOD PRESSURE: 79 MMHG | BODY MASS INDEX: 30.36 KG/M2 | SYSTOLIC BLOOD PRESSURE: 130 MMHG

## 2022-02-18 DIAGNOSIS — M16.11 UNILATERAL PRIMARY OSTEOARTHRITIS, RIGHT HIP: ICD-10-CM

## 2022-02-18 DIAGNOSIS — M16.12 UNILATERAL PRIMARY OSTEOARTHRITIS, LEFT HIP: ICD-10-CM

## 2022-02-18 PROCEDURE — 99213 OFFICE O/P EST LOW 20 MIN: CPT

## 2022-02-18 NOTE — DISCUSSION/SUMMARY
[Medication Risks Reviewed] : Medication risks reviewed [Surgical risks reviewed] : Surgical risks reviewed [de-identified] :  Dr. Jones has seen and evaluated this patient and is guiding this patient’s entire orthopedic management plan. The patient was advised that based upon their clinical presentation and radiographic findings they meet inclusion criteria for benefitting from an Anterior approach(ASI) total hip arthroplasty as a treatment option for severe arthritis of their [left hip.\par The spectrum of treatments for severe hip DJD were reviewed in detail. Dr. Jones reviewed in detail the goals, alternatives, risks and benefits of ASI total hip arthroplasty. \par The patient was advised of the possible complications which are inclusive of but not exclusive to VTE-related, infectious-related, anesthetic-related, surgically-related, Lateral Femoral Cutaneous nerve injury-related, medically-related, and implant-related.\par The patient was advised that limb length equality may not be assured secondary to variabilities in pelvic malrotation, stable intraoperative fit, opposite side wear, and other anatomic deformities of the lower extremity.\par The patient was advised of the goals, alternatives, risks and benefits of autologous, directed and banked blood product transfusions for perioperative blood losses.\par A non-cemented type hip implant is utilized in consideration of bony integrity intraoperatively. \par The patient was educated regarding the surgical procedure steps, especially using an Hip implant and bone model, as well as steps in their hospital course and primary discharge planning for home discharge with home care and home PT. Choices for implant 's, mainly DJO and [employer] were reviewed, with selection to be made by surgeon intraoperatively.\par The patient was advised of the goals, alternatives, risks and benefits of a 3 week course of Celebrex 200 mg BID utilized by Dr. Jones in their practice to prevent Heterotopic Ossification seen in patients post total hip arthroplasty. If this is medically contraindicated the alternative would be a single dose (800cGy) radiation treatment to the hip implant site performed pre-operatively. A consultation with the Radiation Oncologist at [facility] will then be required.\par I reviewed the plan of care as well as a model of the Hip implant equivalent to the one that will be used for the leftTHR. The patient agreed to the plan of care as well as the use of implants for their left THR.\par The patient was advised that they will require a medical preoperative risk evaluation by their PCP. Further medical subspecialty clearances such as cardiac may be indicated if felt needed by their PCP.\par The patient was advised he/she may call our office after careful consideration of their treatment options and further consultation\par The patient was thoroughly educated using models and the actual implant.  All of the patient's questions were answered to their satisfaction.  The patient would need a bedside commode and a rolling walker for use for activities of daily living status post a total joint replacement .\par

## 2022-02-18 NOTE — HISTORY OF PRESENT ILLNESS
[Worsening] : worsening [8] : a current pain level of 8/10 [6] : an average pain level of 6/10 [3] : a minimum pain level of 3/10 [9] : a maximum pain level of 9/10 [Standing] : standing [Daily] : ~He/She~ states the symptoms seem to be occuring daily [Bending] : worsened by bending [Hip Movement] : worsened by hip movement [Lifting] : worsened by lifting [Sitting] : worsened by sitting [Walking] : worsened by walking [Acetaminophen] : relieved by acetaminophen [Exercise Regimen] : relieved by exercise regimen [Heat] : relieved by heat [Ice] : relieved by ice [NSAIDs] : relieved by nonsteroidal anti-inflammatory drugs [Physical Therapy] : relieved by physical therapy [Rest] : relieved by rest [de-identified] : The patient is a 71-year-old gentleman with no significant past medical history who is well-known to this office.  He status post repair of his Achilles tendon approximately 3 years ago with good results.  He was seen in his last visit with bilateral hip pain left more so than right that was ongoing for over a year.  The patient had significant pain and discomfort in the groin with difficulty putting her shoes and socks on and getting of a car.  Patient was taking anti-inflammatories as well as an exercise program.  Patient feels the left hip is worse than the right.  We did obtain MRIs to rule out the extensiveness of arthritic changes.  He is here to go over the results.  The patient tells me he did do a course of physical therapy. [Ataxia] : no ataxia [Incontinence] : no incontinence [Loss of Dexterity] : good dexterity [Urinary Ret.] : no urinary retention

## 2022-02-18 NOTE — PHYSICAL EXAM
[Antalgic] : antalgic [UE/LE] : Sensory: Intact in bilateral upper & lower extremities [ALL] : dorsalis pedis, posterior tibial, femoral, popliteal, and radial 2+ and symmetric bilaterally [Normal RUE] : Right Upper Extremity: No scars, rashes, lesions, ulcers, skin intact [Normal LUE] : Left Upper Extremity: No scars, rashes, lesions, ulcers, skin intact [Normal RLE] : Right Lower Extremity: No scars, rashes, lesions, ulcers, skin intact [Normal LLE] : Left Lower Extremity: No scars, rashes, lesions, ulcers, skin intact [Normal] : No costovertebral angle tenderness and no spinal tenderness [de-identified] : Leg lengths appear equal on the table.  Patient has no pain to palpation of the greater trochanteric regions or the iliotibial bands.  His calves are soft, nontender, no evidence of DVT at this time.  Patient has good motor and sensory to both legs.  Patient can flex the left hip to approximately 90 degrees with some groin discomfort, external rotation 35 degrees, internal rotation 20 degrees, abduction 20 degrees, adduction 15 degrees with pain and discomfort in the groin.  Patient can flex the right hip to 100 degrees, external rotation 40 degrees, internal rotation 30 degrees, abduction 20,adduction 10.  With slight discomfort in the groin. [de-identified] : Intact [de-identified] : MRI shows degenerative changes with intrasubstance tearing of the anterior and superior labrum.  Moderate bilateral hip arthrosis left greater than right with subchondral cystic changes edema within the anterior acetabulum.

## 2022-02-18 NOTE — REASON FOR VISIT
[Initial Visit] : an initial visit for [Hip Pain] : hip pain [Osteoarthritis, Hip] : osteoarthritis, hip [FreeTextEntry2] : Right and left hip pain

## 2022-03-23 ENCOUNTER — OUTPATIENT (OUTPATIENT)
Dept: OUTPATIENT SERVICES | Facility: HOSPITAL | Age: 72
LOS: 1 days | End: 2022-03-23
Payer: MEDICARE

## 2022-03-23 VITALS
TEMPERATURE: 98 F | SYSTOLIC BLOOD PRESSURE: 138 MMHG | RESPIRATION RATE: 14 BRPM | WEIGHT: 222.67 LBS | OXYGEN SATURATION: 96 % | HEIGHT: 67.5 IN | DIASTOLIC BLOOD PRESSURE: 77 MMHG | HEART RATE: 73 BPM

## 2022-03-23 DIAGNOSIS — M16.12 UNILATERAL PRIMARY OSTEOARTHRITIS, LEFT HIP: ICD-10-CM

## 2022-03-23 DIAGNOSIS — Z98.890 OTHER SPECIFIED POSTPROCEDURAL STATES: Chronic | ICD-10-CM

## 2022-03-23 DIAGNOSIS — Z90.89 ACQUIRED ABSENCE OF OTHER ORGANS: Chronic | ICD-10-CM

## 2022-03-23 DIAGNOSIS — Z01.818 ENCOUNTER FOR OTHER PREPROCEDURAL EXAMINATION: ICD-10-CM

## 2022-03-23 DIAGNOSIS — Z98.89 OTHER SPECIFIED POSTPROCEDURAL STATES: Chronic | ICD-10-CM

## 2022-03-23 LAB
A1C WITH ESTIMATED AVERAGE GLUCOSE RESULT: 9.1 % — HIGH (ref 4–5.6)
ALBUMIN SERPL ELPH-MCNC: 3.9 G/DL — SIGNIFICANT CHANGE UP (ref 3.3–5)
ALP SERPL-CCNC: 52 U/L — SIGNIFICANT CHANGE UP (ref 30–120)
ALT FLD-CCNC: 53 U/L DA — SIGNIFICANT CHANGE UP (ref 10–60)
ANION GAP SERPL CALC-SCNC: 6 MMOL/L — SIGNIFICANT CHANGE UP (ref 5–17)
APTT BLD: 29.7 SEC — SIGNIFICANT CHANGE UP (ref 27.5–35.5)
AST SERPL-CCNC: 32 U/L — SIGNIFICANT CHANGE UP (ref 10–40)
BILIRUB SERPL-MCNC: 0.5 MG/DL — SIGNIFICANT CHANGE UP (ref 0.2–1.2)
BLD GP AB SCN SERPL QL: SIGNIFICANT CHANGE UP
BUN SERPL-MCNC: 14 MG/DL — SIGNIFICANT CHANGE UP (ref 7–23)
CALCIUM SERPL-MCNC: 8.8 MG/DL — SIGNIFICANT CHANGE UP (ref 8.4–10.5)
CHLORIDE SERPL-SCNC: 103 MMOL/L — SIGNIFICANT CHANGE UP (ref 96–108)
CO2 SERPL-SCNC: 30 MMOL/L — SIGNIFICANT CHANGE UP (ref 22–31)
CREAT SERPL-MCNC: 1.14 MG/DL — SIGNIFICANT CHANGE UP (ref 0.5–1.3)
EGFR: 70 ML/MIN/1.73M2 — SIGNIFICANT CHANGE UP
ESTIMATED AVERAGE GLUCOSE: 214 MG/DL — HIGH (ref 68–114)
GLUCOSE SERPL-MCNC: 235 MG/DL — HIGH (ref 70–99)
HCT VFR BLD CALC: 47.4 % — SIGNIFICANT CHANGE UP (ref 39–50)
HGB BLD-MCNC: 15.4 G/DL — SIGNIFICANT CHANGE UP (ref 13–17)
INR BLD: 1.04 RATIO — SIGNIFICANT CHANGE UP (ref 0.88–1.16)
MCHC RBC-ENTMCNC: 27 PG — SIGNIFICANT CHANGE UP (ref 27–34)
MCHC RBC-ENTMCNC: 32.5 GM/DL — SIGNIFICANT CHANGE UP (ref 32–36)
MCV RBC AUTO: 83.2 FL — SIGNIFICANT CHANGE UP (ref 80–100)
MRSA PCR RESULT.: SIGNIFICANT CHANGE UP
NRBC # BLD: 0 /100 WBCS — SIGNIFICANT CHANGE UP (ref 0–0)
PLATELET # BLD AUTO: 206 K/UL — SIGNIFICANT CHANGE UP (ref 150–400)
POTASSIUM SERPL-MCNC: 4.2 MMOL/L — SIGNIFICANT CHANGE UP (ref 3.5–5.3)
POTASSIUM SERPL-SCNC: 4.2 MMOL/L — SIGNIFICANT CHANGE UP (ref 3.5–5.3)
PROT SERPL-MCNC: 7.5 G/DL — SIGNIFICANT CHANGE UP (ref 6–8.3)
PROTHROM AB SERPL-ACNC: 12 SEC — SIGNIFICANT CHANGE UP (ref 10.5–13.4)
RBC # BLD: 5.7 M/UL — SIGNIFICANT CHANGE UP (ref 4.2–5.8)
RBC # FLD: 14.9 % — HIGH (ref 10.3–14.5)
S AUREUS DNA NOSE QL NAA+PROBE: SIGNIFICANT CHANGE UP
SODIUM SERPL-SCNC: 139 MMOL/L — SIGNIFICANT CHANGE UP (ref 135–145)
WBC # BLD: 6.96 K/UL — SIGNIFICANT CHANGE UP (ref 3.8–10.5)
WBC # FLD AUTO: 6.96 K/UL — SIGNIFICANT CHANGE UP (ref 3.8–10.5)

## 2022-03-23 PROCEDURE — 93005 ELECTROCARDIOGRAM TRACING: CPT

## 2022-03-23 PROCEDURE — 93010 ELECTROCARDIOGRAM REPORT: CPT

## 2022-03-23 PROCEDURE — G0463: CPT

## 2022-03-23 RX ORDER — FLUTICASONE PROPIONATE 50 MCG
1 SPRAY, SUSPENSION NASAL
Qty: 0 | Refills: 0 | DISCHARGE

## 2022-03-23 RX ORDER — FERROUS SULFATE 325(65) MG
1 TABLET ORAL
Qty: 0 | Refills: 0 | DISCHARGE

## 2022-03-23 RX ORDER — METFORMIN HYDROCHLORIDE 850 MG/1
1 TABLET ORAL
Qty: 0 | Refills: 0 | DISCHARGE

## 2022-03-23 RX ORDER — MONTELUKAST 4 MG/1
1 TABLET, CHEWABLE ORAL
Qty: 0 | Refills: 0 | DISCHARGE

## 2022-03-23 RX ORDER — SITAGLIPTIN 50 MG/1
1 TABLET, FILM COATED ORAL
Qty: 0 | Refills: 0 | DISCHARGE

## 2022-03-23 RX ORDER — VITAMIN E 100 UNIT
1 CAPSULE ORAL
Qty: 0 | Refills: 0 | DISCHARGE

## 2022-03-23 RX ORDER — DICLOFENAC SODIUM 75 MG/1
1 TABLET, DELAYED RELEASE ORAL
Qty: 0 | Refills: 0 | DISCHARGE

## 2022-03-23 RX ORDER — ASCORBIC ACID 60 MG
1 TABLET,CHEWABLE ORAL
Qty: 0 | Refills: 0 | DISCHARGE

## 2022-03-23 RX ORDER — CALCIUM CARBONATE 500(1250)
1 TABLET ORAL
Qty: 0 | Refills: 0 | DISCHARGE

## 2022-03-23 RX ORDER — ASPIRIN/CALCIUM CARB/MAGNESIUM 324 MG
81 TABLET ORAL
Qty: 0 | Refills: 0 | DISCHARGE

## 2022-03-23 RX ORDER — CHOLECALCIFEROL (VITAMIN D3) 125 MCG
1 CAPSULE ORAL
Qty: 0 | Refills: 0 | DISCHARGE

## 2022-03-23 RX ORDER — ASPIRIN/CALCIUM CARB/MAGNESIUM 324 MG
1 TABLET ORAL
Qty: 0 | Refills: 0 | DISCHARGE

## 2022-03-23 RX ORDER — IBUPROFEN 200 MG
2 TABLET ORAL
Qty: 0 | Refills: 0 | DISCHARGE

## 2022-03-23 NOTE — H&P PST ADULT - MUSCULOSKELETAL
details… detailed exam left hip/no joint swelling/no joint erythema/no joint warmth/no calf tenderness/decreased ROM/decreased ROM due to pain/diminished strength

## 2022-03-23 NOTE — H&P PST ADULT - FALL HARM RISK - UNIVERSAL INTERVENTIONS
71 Hour(s) 9 Minute(s)
Bed in lowest position, wheels locked, appropriate side rails in place/Call bell, personal items and telephone in reach/Instruct patient to call for assistance before getting out of bed or chair/Non-slip footwear when patient is out of bed/[place] to call system/Physically safe environment - no spills, clutter or unnecessary equipment/Purposeful Proactive Rounding/Room/bathroom lighting operational, light cord in reach

## 2022-03-23 NOTE — H&P PST ADULT - NSICDXPASTSURGICALHX_GEN_ALL_CORE_FT
PAST SURGICAL HISTORY:  H/O repair of right rotator cuff 2018    H/O right inguinal hernia repair 1980s    S/P Achilles tendon repair left 3/2018    S/P colonoscopy 2013    S/P laminectomy and fusion - 2014 - L4-5    S/P tonsillectomy as child

## 2022-03-23 NOTE — H&P PST ADULT - FUNCTIONAL ASSESSMENT - BASIC MOBILITY ASSESSMENT TYPE
----- Message from Shameka Andrews sent at 5/29/2019  8:39 AM CDT -----  Contact: pt  Type:  Sooner Apoointment Request    Caller is requesting a sooner appointment.  Caller declined first available appointment listed below.  Caller will not accept being placed on the waitlist and is requesting a message be sent to doctor.  Name of Caller: the pt   When is the first available appointment? 06/13/2019  Symptoms: discuss medication  Would the patient rather a call back or a response via [facility]? Call back  Best Call Back Number: [phone]  Additional Information: n/a   Admission

## 2022-03-23 NOTE — H&P PST ADULT - RS GEN PE MLT RESP DETAILS PC
normal/breath sounds equal/good air movement/respirations non-labored/clear to auscultation bilaterally/no rales/no rhonchi/no wheezes

## 2022-03-23 NOTE — H&P PST ADULT - HISTORY OF PRESENT ILLNESS
70 yo male presents with 1 1/2 year history of bilateral hip and groin pain. He states that the left is worse than the right. Denies any prior treatment. Pain now 3-4/10 at rest, 8-9/10 when standing from a sitting position and 6/10 wuth activity. Pain is mildly relieved with aleve or motrin. 70 yo male presents with 1 1/2 year history of bilateral hip and groin pain. He states that the left is worse than the right. Denies any prior treatment. Pain now 3-4/10 at rest, 8-9/10 when standing from a sitting position and 6/10 wuth activity. Pain is mildly relieved with aleve or motrin. Scheduled for left anterior THR on 4/4/22

## 2022-03-23 NOTE — H&P PST ADULT - NSANTHOSAYNRD_GEN_A_CORE
No. CHRIS screening performed.  STOP BANG Legend: 0-2 = LOW Risk; 3-4 = INTERMEDIATE Risk; 5-8 = HIGH Risk neck 19.5 inches/No. CHRIS screening performed.  STOP BANG Legend: 0-2 = LOW Risk; 3-4 = INTERMEDIATE Risk; 5-8 = HIGH Risk

## 2022-03-23 NOTE — H&P PST ADULT - NSICDXFAMILYHX_GEN_ALL_CORE_FT
FAMILY HISTORY:  Family history of stomach cancer    Sibling  Still living? Yes, Estimated age: 51-60  Family history of coronary artery disease in sister, Age at diagnosis: Age Unknown  Family history of drug addiction, Age at diagnosis: Age Unknown  Family history of hypertension in brother, Age at diagnosis: Age Unknown  Family history of lung cancer, Age at diagnosis: Age Unknown

## 2022-03-23 NOTE — H&P PST ADULT - PROBLEM SELECTOR PLAN 1
left anterior THR. medical clearance requested. ERAS and surgical wash instructions reviewed and verbalized understanding. covid PCR appt. confirmed for 4/1/22 left anterior THR. medical clearance requested. instructed to stop aleve, motrin and vitamin E 1 week prior to surgery. ERAS and surgical wash instructions reviewed and verbalized understanding. covid PCR appt. confirmed for 4/1/22 at 0830

## 2022-04-04 ENCOUNTER — APPOINTMENT (OUTPATIENT)
Dept: ORTHOPEDIC SURGERY | Facility: HOSPITAL | Age: 72
End: 2022-04-04

## 2022-07-18 NOTE — H&P PST ADULT - VENOUS THROMBOEMBOLISM CURRENT STATUS
Group Topic:  Group OT    Date: 7/18/2022  Start Time:  1:00 PM  End Time:  1:47 PM  Facilitators: CHAPIN Neal Patients were educated on and practiced coping skills during different therapeutic leisure activities. These therapeutic activities allowed the patient to organize thoughts and feelings and to facilitate clear thinking and better decision-making to help reduce stress and allows the patient to socialize with peers which will allow the patient to engage in meaningful occupations.    Number in attendance: 14    Method: Group  Attendance: Present  Participation: Active  Patient Response: Appropriate feedback and Attentive  Mood: Normal  Affect: Type: Euthymic (normal mood)  Behavior/Socialization: Cooperative  Thought Process: Focused  Task Performance: Follows directions  Patient Evaluation: Independent - full participation   RYAN Stone       major surgery, including arthroscopic and laparoscopic (greater than 1 hr)

## 2022-08-18 NOTE — OCCUPATIONAL THERAPY INITIAL EVALUATION ADULT - LOWER BODY DRESSING, PREVIOUS LEVEL OF FUNCTION, OT EVAL
Chief Complaint   Patient presents with   • Other     Foot injury       HISTORY OF PRESENT ILLNESS:    Iker Barbosa is a 5 year old-male who presents with his parents, because of a right foot injury.    Parents reports today that they were playing at the playground Tuesday (2 days ago), and Iker jumped/fell off of the playground (about 3 feet tall), and injured his right foot. He has been limping, and doesn't want to  his right foot. He has been crawling around. When he does walk, his right foot is turned out. Tuesday night, his foot wasn't swollen. He woke up during the night and was crying. They gave Ibuprofen. Yesterday it became swollen. Today it is bruised    Last Ibuprofen was yesterday.      Patient Active Problem List   Diagnosis   • Avulsion of tooth   • Peanut allergy   • Chronic allergic rhinitis   • Chronic allergic conjunctivitis       History reviewed. No pertinent past medical history.    History reviewed. No pertinent surgical history.    History reviewed. No pertinent family history.    Current Outpatient Medications   Medication Sig Dispense Refill   • EPINEPHrine (EpiPen Jr 2-Elias) 0.15 MG/0.3ML auto-injector Inject 0.3 mLs into the muscle as needed (USE ONLY AS INSTRUCTED). 2 each 3   • cetirizine (ZyrTEC Childrens Allergy) 5 MG/5ML solution 5 mL ORALLY once daily as needed as instructed. 150 mL 5   • diphenhydrAMINE (BENADRYL) 12.5 MG/5ML liquid Take by mouth as needed for Allergies.     • acetaminophen (TYLENOL) 160 MG/5ML solution Take by mouth every 4 hours as needed for Fever.       No current facility-administered medications for this visit.       ALLERGIES:   Allergen Reactions   • Peanut (Diagnostic) ANAPHYLAXIS   • Cat Dander Other (See Comments)     Patient has allergic rhinitis and allergic conjunctivitis. Positive skin testing for cat.      • Dog Dander Other (See Comments)     Hives, allergic rhinitis and allergic conjunctivitis. Positive skin test for dog   • Pollen Other  (See Comments)     Patient has allergic rhinitis and allergic conjunctivitis. Positive skin testing for pollen to trees and weeds.          Health Maintenance Due   Topic   • COVID-19 Vaccine (1)        Social History     Tobacco Use   Smoking Status Never Smoker   Smokeless Tobacco Never Used         REVIEW OF SYSTEMS:  Please see history of present illness.      PHYSICAL EXAMINATION:    VITAL SIGNS:    Visit Vitals  BP 88/58 (BP Location: LUE - Left upper extremity, Patient Position: Sitting, Cuff Size: Pediatric)   Pulse 100   Temp 97.9 °F (36.6 °C) (Axillary)   Ht 3' 3.37\" (1 m)   Wt 15.7 kg (34 lb 9.8 oz)   BMI 15.70 kg/m²     GENERAL:  Iker Barbosa is a 5 year old-male who is well-developed and well nourished. He is in no acute distress, non-toxic appearance.   MUSCULOSKELETAL:  Edema to dorsum of right foot. Pain with palpation of dorsum of right foot, and also over right medial malleolus. No obvious deformities. Pedal pulses 2+ bilaterally. He does limp and turn his right foot out while walking.  SKIN: Capillary refill time less than 2 seconds.  NEUROLOGICAL:  Alert and acting appropriately for age.      ASSESSMENT:   1. Injury of right foot, initial encounter        PLAN:  1. Will get right ankle and foot X-rays today. Parents are aware they will receive a call with the results. We did not have a boot small enough for him at the clinic today. I did wrap his right ankle/foot in an ACE wrap, to provide some stabilization and help with the swelling. Discussed with parents that if his foot or ankle is broken, I will refer him to orthopedics, and he will likely get a boot from them. If it is not broken, continue the ACE wrap as needed. Continue Ibuprofen, elevating it, and icing it. If no fracture, I suspect he will be better within 2 weeks. If he is still having pain in 2 weeks, let me know.    All parental questions were answered.      COTY Servin   independent

## 2022-10-16 NOTE — H&P PST ADULT - NSICDXPASTMEDICALHX_GEN_ALL_CORE_FT
Home
PAST MEDICAL HISTORY:  COVID-19 vaccine series completed     GERD (gastroesophageal reflux disease) on occasion    Hyperlipemia     Nocturia x3    Obesity, Class I, BMI 30-34.9     Osteoarthritis     Seasonal allergies     Type 2 diabetes mellitus     Vitamin D deficiency

## 2022-11-19 ENCOUNTER — NON-APPOINTMENT (OUTPATIENT)
Age: 72
End: 2022-11-19

## 2023-03-05 ENCOUNTER — NON-APPOINTMENT (OUTPATIENT)
Age: 73
End: 2023-03-05

## 2023-03-16 NOTE — H&P PST ADULT - NS ABD PE RECTAL EXAM
A user error has taken place: encounter opened in error, closed for administrative reasons.     not examined

## 2023-06-22 NOTE — PHYSICAL THERAPY INITIAL EVALUATION ADULT - LEVEL OF INDEPENDENCE: SUPINE/SIT, REHAB EVAL
IR Office Visit / Evaluation    Patient: Michele Godoy Date: 2023   : 1942 Attending: Viet Gardner MD    81 year old male          Referring Provider:  Dr. Oakes    Reason for Consult:  Bleeding from back wound    HPI:   82 yo male with a h/o pAF on coumadin INR 2.3, CAD, HTN, DM, SLE, MR, HLD admitted to hospital after bleeding from back wound. He underwent  Recent RF ablation of the medial branches of the facet joint under fluoro guidance for spondylosis with myelopathy and lumbar facet mediated pain. Pt reports he held his coumadin as directed. He was doing well until las tnight when he began having bleeding from the wound on the right low back. He reports it was very brisk bleeding at home as well as in the ER. No dizziness/ light headedness currently. No CP/ SOB. IR consulted for possible angiogram and embolization.     Patient Active Problem List    Diagnosis Date Noted   • Pain- Interventional Pain Management 2018     Priority: Medium     Procedure Relief Sheet: Michele Godoy     Medicare Ref: Dr. Almanza     Date  Procedure/Office visit % Relief Amt  Steroid NOTES Provider/  Initial   18 NPE       2018 Right Piriformis injection (60-70% pain relief)  100% K40   sa/dg    18 FJNB (B) L3-4 L4-5 L5S1 100%   sa/dg   18 FJNB (R) L3-4 L4-5 L5S1 100%   sa/dg           19 RFA (B) L3-4 L4-5 L5S1 100% X 8 MONTHS K40  sa/dg   19 RFA (B) L3-4 L4-5 L5S1 50% K40  sa/dg   19 SI joint (right) 100% K40  sa/dg           20 SI joint (bilateral) 75% RR   sa/dg           21 Caudal red 100% right  side k40  sa   05/10/21 SI JOINT, LEFT 0% initial- then 100%  Extra-articular sa/dg   21 RIGHT  hip joint injection 90%/100% K40  sa/bs   21 Right trochanteric bursa- Dr Almanza  K40  sb   21 L GH joint and L knee inj DR ALMANZA  K80 total  sb   21 RIGHT  hip joint injection  K40  sa/bs           01/10/22 RIGHT  hip joint injection  AND Right trochanteric bursa injection 100% K80 total  sa/bs   06/06/22 Bilateral SI joint injections  Extra-articular 80% K40  sa/bs   07/25/22 Right  hip joint injection AND Right trochanteric bursa injection 90%   sa/bs   09/19/22 Bilateral SI joint injections  Extra-articular 100%   sa/bs                          • Pancreatic mass 06/22/2023     Priority: Low   • [employer] LINQ 2 ILR 03/29/2023     Priority: Low     Implanted 3/10/23 after negative EPS, for syncope and hx of PAF.      • Long term current use of amiodarone 03/29/2023     Priority: Low     Amiodarone started 3/10/23, after negative EPS, for hx of PAF and high PVC burden.      • Syncope 03/07/2023     Priority: Low     3/10/23: s/p EP study   IMPRESSION:  1: Normal baseline conduction parameters on EPS  2: No inducible sustained supraventricular or ventricular tachycardia noted.  3: Successful implantable loop recorder implantation.          • Volume depletion 02/11/2023     Priority: Low   • Diverticulitis 02/09/2023     Priority: Low   • Chest pain, unspecified type 11/30/2022     Priority: Low   • Mitral valve insufficiency, unspecified etiology 10/25/2022     Priority: Low   • Atrial flutter with rapid ventricular response (CMD) 10/19/2022     Priority: Low   • Left ankle pain 07/28/2021     Priority: Low   • Thoracic myofascial strain 05/13/2021     Priority: Low   • Neuropathy 02/12/2021     Priority: Low   • Sacroiliitis, not elsewhere classified (CMD) 01/28/2021     Priority: Low   • Polyradiculopathy 11/20/2020     Priority: Low   • Polyneuropathy 11/20/2020     Priority: Low   • Arachnoiditis 02/13/2020     Priority: Low   • Postlaminectomy syndrome, lumbar region 02/13/2020     Priority: Low   • Warfarin anticoagulation 07/29/2019     Priority: Low     INR (no units)   Date Value   03/24/2023 2.2          • Paroxysmal atrial fibrillation (CMD) 07/11/2019     Priority: Low     Echocardiogram:   LVEF:   Ejection Fraction   Date Value Ref  Range Status   03/08/2023 59 % Final   LA size:35.6 ml/m².  IVS:  1.0 cm  LVPW:1.1 cm  CHADs score:6  (CAD, HTN, AGE, DM, AGE)  Anticoagulation: Warfarin  Antiarrhythmic: Amiodarone       • Coronary artery disease involving coronary bypass graft of native heart with angina pectoris (CMD) 02/19/2019     Priority: Low   • Hypertensive heart disease with heart failure (CMD) 09/26/2018     Priority: Low   • Personal history of basal cell carcinoma on the right tip of the nose treated with MOHS 04/2018 06/14/2018     Priority: Low   • Ischemic heart disease due to coronary artery obstruction (CMD) 05/25/2018     Priority: Low   • Diabetic nephropathy associated with type 2 diabetes mellitus (CMD) 05/25/2018     Priority: Low   • DJD L glenohumeral joint 12/19/2017     Priority: Low   • Subacute cutaneous lupus erythematosus 11/02/2016     Priority: Low   • Gastric polyp 06/24/2016     Priority: Low   • Diverticulosis 06/07/2016     Priority: Low   • Type 2 diabetes mellitus with diabetic peripheral angiopathy without gangrene, without long-term current use of insulin (CMD) 10/12/2015     Priority: Low   • Mitral valve regurgitation 07/16/2015     Priority: Low     Moderate per ECHO 7/13/2015     • Gouty arthritis 06/11/2015     Priority: Low   • Osteopenia 02/26/2015     Priority: Low   • Benign prostatic hyperplasia 02/26/2015     Priority: Low   • Erectile dysfunction 02/26/2015     Priority: Low   • Chronic renal insufficiency, stage II (mild) 11/18/2014     Priority: Low   • History of cardiac catheterization 06/06/2014     Priority: Low     4/18/2012  SUMMARY:   1. Severe 3-vessel coronary artery disease.   2. Patent bypass grafts.   3. Severe disease and bypass graft, aorta to posterior descending artery after anastomosis distally.       • Abnormal echocardiogram 06/06/2014     Priority: Low     1/31/2014 Echocardiogram   Indication: pre-operative evaluation for abdominal surgery  History of CAD; S/P CABG  TDS:  contrast used - post op paradoxical septal motion  Normal LV size and systolic function  LVEF = 60%  Grade 2/4 diastolic dysfunction  Grossly valve function os OK  No pericardial effusion  Unable to estimate PASP     • Liver disease, granuloma 02/05/2014     Priority: Low   • Myofascial pain syndrome of lumbar spine 08/02/2013     Priority: Low   • Axillary fullness 03/20/2013     Priority: Low   • Dyslipidemia 05/07/2012     Priority: Low   • Hematoma complicating a procedure 05/03/2012     Priority: Low     Right groin, post cath of 4/18/2012     • Postsurgical aortocoronary bypass status 04/10/2012     Priority: Low   • History of SCC (squamous cell carcinoma) on his left forehead in 2004 and basal cell carcinoma on the left forehead in 2008 10/25/2011     Priority: Low   • Hooks's esophagus 08/24/2009     Priority: Low   • Esophageal reflux 08/24/2009     Priority: Low   • Trigger Point - Right Rhomboid 08/17/2009     Priority: Low     Past Medical History:   Diagnosis Date   • Abnormal echocardiogram 06/06/2014 1/31/2014 Echocardiogram  Indication: pre-operative evaluation for abdominal surgery History of CAD; S/P CABG TDS: contrast used - post op paradoxical septal motion Normal LV size and systolic function LVEF = 60% Grade 2/4 diastolic dysfunction Grossly valve function os OK No pericardial effusion Unable to estimate PASP    • AF (atrial fibrillation) (CMD) 10/19/2022   • Anemia    • Anxiety    • Hooks's esophagus 08/24/2009   • Basal cell carcinoma 04/03/2018    nose   • BCC (basal cell carcinoma of skin) 2008    SKIN CANCER ON LT  FOREHEAD   • Blood transfusion 2000   • Chronic kidney disease 08/12/2014    stage 3   • Chronic pain 08/02/2013    back, hip   • Claustrophobia    • Coronary artery disease    • Degenerative joint disease     gouty   • Diabetes mellitus (CMD)    • Discoid lupus    • Dyslipidemia 05/07/2012   • Eczema    • Erectile dysfunction    • Fracture of phalanx of left great toe  2021   • Gastritis 11/27/2018    Dr. Thurman   • Gastroesophageal reflux disease    • Glaucoma - left eye    • Hematoma complicating a procedure 05/03/2012    Right groin, post cath of 4/18/2012    • History of cardiac catheterization 06/06/2014 4/18/2012 SUMMARY:  1. Severe 3-vessel coronary artery disease.  2. Patent bypass grafts.  3. Severe disease and bypass graft, aorta to posterior descending artery after anastomosis distally.     • HTN (hypertension), benign 12/06/2011   • Impaired functional mobility, balance, gait, and endurance     ambulates with a cane or wheelchair for longer distances   • Liver disease, granuloma 02/05/2014   • Lung nodule    • Mitral valve regurgitation 07/16/2015    Moderate per ECHO 7/13/2015    • Myocardial infarct (CMD)     6/2000---4 vessel bypass   • Other specified gastritis without mention of hemorrhage 08/24/2009   • Pneumonia     age 52's   • Postsurgical aortocoronary bypass status 04/10/2012   • SARS pneumonia 2019    8 days in [place] and 4 of those days in ICU at [facility]   • Schatzki's ring, Dilated 11/27/2018    Dr. Thurman   • Sciatica    • Squamous cell skin cancer 2004    Left Forehead   • Syncope    • Wears glasses       Past Surgical History:   Procedure Laterality Date   • Anes chemosurg mohs tech 2nd stage up to 5 specmn Right 04/18/2018    Basal cell carcinoma right nasal tip   • Back surgery  02/10/2011    LAMINECTOMY   • Cardiac catherization  06/16/2014   • Cardiac catherization  02/14/2019   • Cardiac catherization  04/18/2012    Pre-op clearance for back   • Cardiac surgery  06/2000    CABG x 4 Vessels   • Colonoscopy diagnostic  10/08/2003    Diverticulosis, otherwise examination was normal, f/u 10 yrs   • Colonoscopy diagnostic  10/29/2013    nml   • Colonoscopy w/ polypectomy  07/27/2022    2 cecal polyps, pan diverticulosis, internal hemorroids. Dr. Marroquin   • Dexa bone density axial skeleton  05/21/2010   • Egd  07/10/2020    repeat EGD 3 yr, hx  Hooks's, Dr. Marroquin   • Esophagogastroduodenoscopy  06/2016    normal. repeat 3-5 yrs   • Esophagogastroduodenoscopy transoral flex w/bx single or mult  08/24/2009    short seg Barretts , gastritis, Dr Gibson  1yr   • Esophagogastroduodenoscopy transoral flex w/bx single or mult  06/02/2008    H pylori results not in chart-Esophageal mucosal changes consistent with short segment barretts esophagus, normal stomach, normal duodenum   • Esophagogastroduodenoscopy transoral flex w/bx single or mult  11/12/2007    H pylori negative-Distal esophagus bx-chronic esophagitis with intestinal metaplasia, focal glandular atypia, indefinite for low grade dysplasia   • Esophagogastroduodenoscopy transoral flex w/bx single or mult  06/05/2006    H pylori negative-Esophagus, lower/distal-barretts esophagus, no dysplasia is seen chronic inflammation of gastric type mucosa, esophageal mucosa with mild nonspecific reactive changes   • Esophagogastroduodenoscopy transoral flex w/bx single or mult  10/05/2010    barretts - sht segment   • Esophagogastroduodenoscopy transoral flex w/bx single or mult  10/10/2011    SS Barretts, no dysplasia   • Esophagogastroduodenoscopy transoral flex w/bx single or mult  04/02/2013    grossly nml esoph , gastritis   • Esophagogastroduodenoscopy transoral flex w/bx single or mult  11/27/2018    Dr. Thurman/ EGD/ Schatzki's Ring dilated and gastritis Recall 2 years   • Esophagogastroduodenoscopy transoral flex w/bx single or mult  07/27/2022    Gastritis, Hx of Hooks's Dr. Marroquin   • Esophagoscopy transoral flex w/bx  03/21/2012    grossly nml.  Bxs at eg jx for barretts, 1 yr recall   • Esophagoscopy,ablation tumor  01/11/2012    SS Barretts - 30 ablations   • Eye surgery Left 2003    Retinal peel   • Eye surgery Left 2003    Left Cataract Extraction w/ IOL implant ~ 1 month after membrane peel   • Eye surgery Right ~2013    Right Cataract Extraction w/ IOL implant   • Loop recorder implant   03/10/2023    and  study. Dr. James [facility]   • Mri      Multiple   • Paravertebral facet inj jnt lumbar sacral 1  2018    Lumbar Facet / MBB #1   • Removal gallbladder     • Removal of tonsils,<13 y/o  1948    Tonsillectomy Alone   • Rev upper eyelid w excess skin  2006    Blepharoplasty, bilateral with an eyebrow tuck   • Rev upper eyelid w excess skin  2007    Blepharoplasty, right eye   • Skin biopsy     • Spine surgery procedure unlisted  2003    micro diskectomy of S2; knicked S1 nerve root   • Thoracentesis     • Tonsillectomy and adenoidectomy  AGE 8     Social History     Tobacco Use   • Smoking status: Former     Current packs/day: 0.00     Average packs/day: 2.0 packs/day for 22.0 years (44.0 pk-yrs)     Types: Cigarettes     Start date:      Quit date:      Years since quittin.4   • Smokeless tobacco: Never   Substance Use Topics   • Alcohol use: Yes     Alcohol/week: 21.0 standard drinks of alcohol     Types: 7 Glasses of wine, 14 Cans of beer per week     Comment: 2 beers daily at HS; 1 glass wine daily dinner        Family History   Problem Relation Age of Onset   • Stroke Father    • Heart disease Mother    • Diabetes Sister    • Cancer Sister         breast   • Heart disease Sister    • Diabetes Other    • Cancer Other 40         of melanoma      Current Facility-Administered Medications   Medication Dose Route Frequency Provider Last Rate Last Admin   • tranexamic acid (CYKLOKAPRON) solution 500 mg  1 application. Topical Once Angelica Fitzgerald MD       • sodium chloride 0.9 % flush bag 25 mL  25 mL Intravenous PRN Nayan Mandujano MD       • sodium chloride (PF) 0.9 % injection 2 mL  2 mL Intracatheter 2 times per day Nayan Mandujano MD       • sodium chloride (NORMAL SALINE) 0.9 % bolus 500 mL  500 mL Intravenous PRN Nayan Mandujano MD       • dextrose 50 % injection 25 g  25 g Intravenous PRN Nayan Mandujano MD        • dextrose 50 % injection 12.5 g  12.5 g Intravenous PRN Nayan Mandujano MD       • glucagon (GLUCAGEN) injection 1 mg  1 mg Intramuscular PRN Nayan Mandujano MD       • dextrose (GLUTOSE) 40 % gel 15 g  15 g Oral PRN Nayan Mandujano MD       • dextrose (GLUTOSE) 40 % gel 30 g  30 g Oral PRN Nayan Mandujano MD       • insulin lispro (ADMELOG,HumaLOG) - Correction Dose   Subcutaneous Nightly Nayan Mandujano MD       • insulin lispro (ADMELOG,HumaLOG) - Correction Dose   Subcutaneous TID WC Nayan Mandujano MD       • phytonadione (vitamin K1) (AQUA-MEPHYTON) 5 mg in sodium chloride 0.9 % 50 mL IVPB  5 mg Intravenous Once Catrachito Glez  mL/hr at 06/22/23 0926 5 mg at 06/22/23 0926   • hydrALAZINE (APRESOLINE) injection 10 mg  10 mg Intravenous Q6H PRN Francisco Oakes DO       • sodium chloride 0.9% infusion   Intravenous Continuous Francisco Oakes DO          ALLERGIES:   Allergen Reactions   • Chlorhexidine RASH   • Tetanus Toxoid SHORTNESS OF BREATH and Other (See Comments)     tachycardia          Review of Systems:  A 10 point ROS was obtained with the pertinent items noted in the HPI.    Vital signs in last 24 hours:  Visit Vitals  BP (!) 198/82 (Patient Position: Supine)   Pulse 97   Temp 98 °F (36.7 °C) (Temporal)   Resp 18   Ht 6' 1\" (1.854 m)   Wt 93.4 kg (205 lb 14.6 oz)   SpO2 98%   BMI 27.17 kg/m²          Physical Exam:  GENERAL: A&Ox3, well-appearing, in NAD.  HEENT: NC/AT. Conjunctivae and sclerae are clear and without icterus.      RESPIRATORY: Lungs are clear to auscultation without rhonchi or wheezing.   CARDIOVASCULAR: Regular rate and rhythm of heart  CHEST: Chest is symmetric, without chest wall deformities.   BACK: Slight bruising right lower lumbar region Dressing in place with dark red blood oozing.  ABDOMEN: Soft, non-tender  INTEGUMENTARY: No abnormal rashes, scars, or lesions.  NEUROLOGIC: No sensory or motor deficits, normal  gait, cranial nerves grossly intact.   PSYCHIATRIC: Coherent speech. Asks appropriate questions. Verbalizes understanding of our discussions today.           Laboratory Results:  Lab Results   Component Value Date    SODIUM 131 (L) 06/22/2023    SODIUM 137 06/14/2023    SODIUM 138 03/11/2023    POTASSIUM 4.1 06/22/2023    POTASSIUM 4.5 06/14/2023    POTASSIUM 4.6 06/07/2023    BUN 28 (H) 06/22/2023    BUN 20 06/14/2023    BUN 18 03/11/2023    CREATININE 1.05 06/22/2023    CREATININE 0.94 06/14/2023    CREATININE 1.24 (H) 06/07/2023    WBC 7.8 06/22/2023    WBC 4.8 06/14/2023    WBC 4.6 06/07/2023    HCT 34.5 (L) 06/22/2023    HCT 36.4 (L) 06/14/2023    HCT 36.4 (L) 06/07/2023    HGB 11.6 (L) 06/22/2023    HGB 12.1 (L) 06/14/2023    HGB 11.9 (L) 06/07/2023     (L) 06/22/2023     06/14/2023     06/07/2023    INR 2.3 06/22/2023    INR 1.6 06/20/2023    INR 1.7 06/16/2023     02/04/2021    RAPDTR <0.02 05/12/2019    RAPDTR 0.02 11/15/2016    RAPDTR <0.02 11/15/2016     (H) 11/14/2016     (H) 10/17/2016    BNP 79 02/13/2014    GLUCOSE 106 (H) 06/22/2023    GLUCOSE 82 06/14/2023    GLUCOSE 120 (H) 03/11/2023    TSH 3.137 06/14/2023    TSH 1.869 03/07/2023    TSH 1.007 10/20/2022    MG 1.9 03/09/2023    MG 1.9 03/07/2023    MG 2.1 03/07/2023    BILIRUBIN 0.8 06/14/2023    BILIRUBIN 0.6 03/07/2023    BILIRUBIN 0.4 02/13/2023     The above labs were reviewed by the writer.     Diagnostic Results:   CT ABDOMEN PELVIS W CONTRAST    Addendum Date: 6/22/2023 Addendum:   Addendum: There is an area of contrast extravasation into the right paraspinal muscle at the L4 level (image 91 of series 2). There is small associated fluid collection measuring approximately 2 x 1.8 x 2 cm corresponding to a localized intramuscular hematoma. Findings were discussed with Dr. Angelica Fitzgerald at 0435 hours on 6/22/2023 Electronically Signed by: Rocío Ibrahim MD Signed on: 6/22/2023 4:36 AM  Workstation ID: [id number]    Result Date: 6/22/2023  Narrative: EXAM: CT ABDOMEN PELVIS W CONTRAST INDICATION: Patient underwent radiofrequency ablation of medial branches of the facet joints from lumbar spondylosis on 6/16/2023. Patient is on blood thinners and complains of back pain with numbness and tingling. There is bleeding from the surgical wound site. There is concern of possible retroperitoneal hemorrhage. COMPARISON: 2/11/2023 and 2/9/2023. TECHNIQUE: Axial CT images through the abdomen and pelvis were performed following intravenous contrast. 100 mL Omnipaque 300 contrast was given for the study. This CT exam was performed using one or more of the following dose reduction techniques: Automated exposure control, adjustment of the mA and/or KV according to patient size, and/or use of iterative reconstructive technique. FINDINGS: There is small right and trace left pleural effusions which have increased in size compared to the patient's previous study. Bibasilar atelectasis is also noted. There is no pneumothorax. Bilateral gynecomastia is noted. Postoperative changes are seen from coronary artery bypass grafting. Heart size is normal. There are multiple low-attenuation liver masses which were not present on the study of 2/11/2023. The largest mass measures 3.2 x 2.7 cm in segment 4. Innumerable other masses are present. The findings are concerning for metastatic disease. Patient's had a cholecystectomy. There is no biliary dilatation. Calcified granulomatous are seen in the spleen. There is a mass in the tail the pancreas measuring 3.2 x 2.6 x 2.4 cm. This is concerning for neoplastic process. This has significantly increased from patient's previous study where it was difficult to identify. The adrenal glands are unremarkable. There are cysts on both kidneys. There is no hydronephrosis. There is mild distal hydroureter on the left. There is no obstructing stone. Urinary bladder is unremarkable. Prostate  gland and seminal vesicles are unremarkable. Abdominal aorta is normal in course and caliber. Mesenteric vasculature remains patent. There is no adenopathy in the abdomen or pelvis. There is a trace amount of pelvic ascites. This appears to be simple fluid. There is no intraperitoneal or retroperitoneal hematoma. There is diverticulosis without diverticulitis. The appendix is normal. There is no bowel obstruction, pneumatosis, abscess, or free intraperitoneal air. Small amount of air in the subcutaneous tissues of the anterior abdominal wall along with soft tissue stranding is likely due to injection of medications. There is no focal soft tissue hematoma. There is no paraspinal hematoma. There are postsurgical changes in the lumbar spine from previous laminectomy at L4. There are multilevel degenerative changes of the lumbar spine. There is no acute fracture. No lytic or blastic bone lesion is identified. There are no findings to suggest an epidural hematoma.     Impression: IMPRESSION: 1. 2.2 x 2.6 x 2.4 cm mass in the tail the pancreas highly concerning for neoplastic process 2. Innumerable low-attenuation liver masses new from the patient's study of 2/11/2023 and highly concerning for metastatic disease. 3. No evidence of intraperitoneal or retroperitoneal hematoma. No paraspinal hematoma or evidence of epidural hematoma. 4. Diverticulosis without diverticulitis. 5. Increase in small right and trace left pleural effusion with bibasilar atelectasis. 6. Extensive lumbar spondylosis changes. There is no acute fracture or lytic or blastic bone lesion. Electronically Signed by: Rocío Ibrahim MD Signed on: 6/22/2023 3:53 AM Workstation ID: [id number]    FL INTRAOPERATIVE C ARM NO REPORT    Result Date: 6/16/2023  Narrative: Findings from this exam can be found in operative or procedural report section.     MRI BRAIN W WO CONTRAST    Result Date: 6/7/2023  Narrative: EXAM: MRI BRAIN W WO CONTRAST HISTORY: 6 month  f/u Meningioma COMPARISON: 12/2/2022. TECHNIQUE:  Multiplanar, multisequence MR imaging is performed of the brain.  10 mL of intravenous Gadavist was administered. FINDINGS: There is an 0.8 x 2.5 x 3.1 cm dural based enhancing mass centered at the right frontal lobe. This previously measured 0.8 x 2.4 x 3.5 cm (unchanged given slight differences in positioning/technique). As on the prior study, there is intraosseous involvement. The interosseous component is difficult to measure precisely but is best visualized on the diffusion-weighted sequence, where maximum diameter is on the order of 4.3 cm. This appears slightly increased compared to the prior MRI where in similar obliquities measures approximately 3.5 cm. There is, however, increased extension of the mass into the overlying right frontal scalp, which measures 0.7 x 2.9 x 2.2 cm previously the extracalvarial scalp component is difficult to measure precisely and was likely 3 to 4 mm in thickness. Mild mass effect on the right frontal lobe remains unchanged. No new intra or extra-axial enhancing lesions are identified. The ventricles and sulci are mildly prominent, consistent with age-related volume loss. The vascular flow voids are unremarkable. No restricted diffusion is appreciated in the brain. Restricted diffusion within the aforementioned mass may be related to hypercellularity. Patchy T2/FLAIR white matter (most likely represent chronic microvascular ischemic changes.     Impression: IMPRESSION: The intra-axial dural component of frontal meningioma remains unchanged but the interosseous component has increased in size since prior MRI; there is further extension of the mass into the right frontal scalp.       Imaging studies were reviewed with the patient.    ASSESSMENT/ PLAN  82 yo male admitted to hospital with bleeding lumbar wound after RF ablation of facet joints.    Case and imaging reviewed with Dr. Glez  Recommend angiogram with possible  intervention.  Discussed procedure as well as risks, Edmond is agreeable.  Order placed.      Jesusita Adam PA-C  Interventional Radiology  Available via Epic Secure Chat ([phone])    Please page the on-call IR provider for urgent needs outside of the above hours.     independent

## 2024-08-15 ENCOUNTER — APPOINTMENT (OUTPATIENT)
Dept: ORTHOPEDIC SURGERY | Facility: CLINIC | Age: 74
End: 2024-08-15
Payer: MEDICARE

## 2024-08-15 VITALS
DIASTOLIC BLOOD PRESSURE: 74 MMHG | SYSTOLIC BLOOD PRESSURE: 120 MMHG | WEIGHT: 205 LBS | HEIGHT: 69 IN | BODY MASS INDEX: 30.36 KG/M2

## 2024-08-15 DIAGNOSIS — M75.90 BURSITIS OF UNSPECIFIED SHOULDER: ICD-10-CM

## 2024-08-15 DIAGNOSIS — M75.50 BURSITIS OF UNSPECIFIED SHOULDER: ICD-10-CM

## 2024-08-15 DIAGNOSIS — M25.512 PAIN IN LEFT SHOULDER: ICD-10-CM

## 2024-08-15 PROCEDURE — 73030 X-RAY EXAM OF SHOULDER: CPT | Mod: LT

## 2024-08-15 PROCEDURE — 20610 DRAIN/INJ JOINT/BURSA W/O US: CPT | Mod: LT

## 2024-08-15 PROCEDURE — 99215 OFFICE O/P EST HI 40 MIN: CPT | Mod: 25

## 2024-08-15 NOTE — ASSESSMENT
[FreeTextEntry1] : ASSESSMENT: The patient comes in today with chronic worsening exacerbated symptoms of left shoulder tendinopathy impingement arthropathy weakness.  With this in mind we have discussed treatment options for all the above.  I do recommend physical therapy and today he elects for a subacromial injection.   The patient was adequately and thoroughly informed of my assessment of their current condition(s).  - This may diminish bodily function for the extremity. We discussed prognosis, tx modalities including operative and nonoperative options for the above diagnostic assessment. As always, 2nd opinion is always provided as an option.  When accessible, I was able to review other physicians note(s) including reviewing other tests, imaging results as well as personally view these results for my own interpretation.   Left SUBACROMIAL SHOULDER INJECTION   Indication:  subacromial bursitis/impingement, pain   Risk, benefits and alternatives were discussed with the patient. Potential complications include bleeding and infection. Alcohol was used to prep the area.  Ethyl chloride spray was used as a topical anesthetic.  Using sterile technique, the injection needle was then directed from a standard posterior approach parallel to and inferior to the acromion. A 21g needle was used to inject 5 mL of 1% Lidocaine and 1 unit 10mg Kenalog.  No significant resistance was encountered.  A bandage was applied.  The patient tolerated the procedure well.    Patient instructed to avoid strenuous activity for 2 day(s). Specifically counseled regarding the signs and symptoms of potential infection and instructed to present promptly to clinic or hospital if such signs and symptoms arise.  The patient was adequately and thoroughly informed of my assessment of their current condition(s).  DISCUSSION: 1.  Left shoulder subacromial injection.  PT prescription provided.  Follow-up 3 months 2. [x] 3. [x]

## 2024-08-15 NOTE — HISTORY OF PRESENT ILLNESS
[FreeTextEntry1] : Donaldo is a pleasant 74-year-old male who presents today with a longstanding history of left shoulder discomfort difficulty with range of motion.  It inhibits ADLs

## 2024-08-15 NOTE — PHYSICAL EXAM
[de-identified] : Examination of the [left] shoulder reveals equal active and passive motion as compared to the contralateral side There is a positive Speed, positive Perlita, positive Thompson, tenderness with anterior shoulder compression. 3+/5 strength [de-identified] : [4] views of [left] shoulder were obtained today in my office and were seen by me and discussed with the patient.  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

## 2024-10-31 ENCOUNTER — APPOINTMENT (OUTPATIENT)
Dept: ORTHOPEDIC SURGERY | Facility: CLINIC | Age: 74
End: 2024-10-31
Payer: MEDICARE

## 2024-10-31 VITALS
WEIGHT: 205 LBS | HEIGHT: 69 IN | DIASTOLIC BLOOD PRESSURE: 68 MMHG | SYSTOLIC BLOOD PRESSURE: 103 MMHG | BODY MASS INDEX: 30.36 KG/M2

## 2024-10-31 DIAGNOSIS — M25.512 PAIN IN LEFT SHOULDER: ICD-10-CM

## 2024-10-31 DIAGNOSIS — M75.90 BURSITIS OF UNSPECIFIED SHOULDER: ICD-10-CM

## 2024-10-31 DIAGNOSIS — M19.019 PRIMARY OSTEOARTHRITIS, UNSPECIFIED SHOULDER: ICD-10-CM

## 2024-10-31 DIAGNOSIS — M75.50 BURSITIS OF UNSPECIFIED SHOULDER: ICD-10-CM

## 2024-10-31 PROCEDURE — 20610 DRAIN/INJ JOINT/BURSA W/O US: CPT | Mod: 59,LT

## 2024-10-31 PROCEDURE — 99214 OFFICE O/P EST MOD 30 MIN: CPT | Mod: 25

## 2025-01-30 ENCOUNTER — APPOINTMENT (OUTPATIENT)
Dept: ORTHOPEDIC SURGERY | Facility: CLINIC | Age: 75
End: 2025-01-30
Payer: MEDICARE

## 2025-01-30 VITALS
BODY MASS INDEX: 30.36 KG/M2 | SYSTOLIC BLOOD PRESSURE: 97 MMHG | HEIGHT: 69 IN | WEIGHT: 205 LBS | DIASTOLIC BLOOD PRESSURE: 65 MMHG

## 2025-01-30 DIAGNOSIS — M25.512 PAIN IN LEFT SHOULDER: ICD-10-CM

## 2025-01-30 DIAGNOSIS — M75.90 BURSITIS OF UNSPECIFIED SHOULDER: ICD-10-CM

## 2025-01-30 DIAGNOSIS — M75.50 BURSITIS OF UNSPECIFIED SHOULDER: ICD-10-CM

## 2025-01-30 DIAGNOSIS — M19.019 PRIMARY OSTEOARTHRITIS, UNSPECIFIED SHOULDER: ICD-10-CM

## 2025-01-30 PROCEDURE — 99214 OFFICE O/P EST MOD 30 MIN: CPT | Mod: 25

## 2025-01-30 PROCEDURE — 20610 DRAIN/INJ JOINT/BURSA W/O US: CPT | Mod: LT

## 2025-05-01 ENCOUNTER — APPOINTMENT (OUTPATIENT)
Dept: ORTHOPEDIC SURGERY | Facility: CLINIC | Age: 75
End: 2025-05-01
Payer: MEDICARE

## 2025-05-01 VITALS
DIASTOLIC BLOOD PRESSURE: 67 MMHG | BODY MASS INDEX: 30.36 KG/M2 | SYSTOLIC BLOOD PRESSURE: 111 MMHG | HEIGHT: 69 IN | WEIGHT: 205 LBS

## 2025-05-01 DIAGNOSIS — M77.8 OTHER ENTHESOPATHIES, NOT ELSEWHERE CLASSIFIED: ICD-10-CM

## 2025-05-01 DIAGNOSIS — M19.012 PRIMARY OSTEOARTHRITIS, LEFT SHOULDER: ICD-10-CM

## 2025-05-01 DIAGNOSIS — M25.612 STIFFNESS OF LEFT SHOULDER, NOT ELSEWHERE CLASSIFIED: ICD-10-CM

## 2025-05-01 PROCEDURE — 20610 DRAIN/INJ JOINT/BURSA W/O US: CPT | Mod: LT,59

## 2025-05-01 PROCEDURE — 99214 OFFICE O/P EST MOD 30 MIN: CPT | Mod: 25

## 2025-05-07 RX ORDER — MELOXICAM 15 MG/1
15 TABLET ORAL
Qty: 30 | Refills: 0 | Status: ACTIVE | COMMUNITY
Start: 2025-05-07 | End: 1900-01-01

## 2025-08-21 ENCOUNTER — APPOINTMENT (OUTPATIENT)
Dept: ORTHOPEDIC SURGERY | Facility: CLINIC | Age: 75
End: 2025-08-21
Payer: MEDICARE

## 2025-08-21 VITALS
HEIGHT: 69 IN | WEIGHT: 205 LBS | BODY MASS INDEX: 30.36 KG/M2 | HEART RATE: 73 BPM | DIASTOLIC BLOOD PRESSURE: 71 MMHG | SYSTOLIC BLOOD PRESSURE: 120 MMHG

## 2025-08-21 DIAGNOSIS — M19.012 PRIMARY OSTEOARTHRITIS, LEFT SHOULDER: ICD-10-CM

## 2025-08-21 DIAGNOSIS — M77.8 OTHER ENTHESOPATHIES, NOT ELSEWHERE CLASSIFIED: ICD-10-CM

## 2025-08-21 DIAGNOSIS — M25.612 STIFFNESS OF LEFT SHOULDER, NOT ELSEWHERE CLASSIFIED: ICD-10-CM

## 2025-08-21 PROCEDURE — 20610 DRAIN/INJ JOINT/BURSA W/O US: CPT | Mod: LT

## 2025-08-21 PROCEDURE — 99214 OFFICE O/P EST MOD 30 MIN: CPT | Mod: 25
